# Patient Record
Sex: FEMALE | Race: WHITE | NOT HISPANIC OR LATINO | Employment: FULL TIME | URBAN - METROPOLITAN AREA
[De-identification: names, ages, dates, MRNs, and addresses within clinical notes are randomized per-mention and may not be internally consistent; named-entity substitution may affect disease eponyms.]

---

## 2016-04-22 LAB
EXTERNAL CHLAMYDIA SCREEN: NEGATIVE
EXTERNAL GONORRHEA SCREEN: NEGATIVE

## 2016-12-16 LAB — EXTERNAL GROUP B STREP ANTIGEN: NEGATIVE

## 2017-01-03 ENCOUNTER — GENERIC CONVERSION - ENCOUNTER (OUTPATIENT)
Dept: OTHER | Facility: OTHER | Age: 32
End: 2017-01-03

## 2017-01-09 ENCOUNTER — GENERIC CONVERSION - ENCOUNTER (OUTPATIENT)
Dept: OTHER | Facility: OTHER | Age: 32
End: 2017-01-09

## 2017-01-11 ENCOUNTER — HOSPITAL ENCOUNTER (INPATIENT)
Facility: HOSPITAL | Age: 32
LOS: 2 days | Discharge: HOME | End: 2017-01-13
Attending: OBSTETRICS & GYNECOLOGY | Admitting: OBSTETRICS & GYNECOLOGY
Payer: COMMERCIAL

## 2017-01-11 ENCOUNTER — ANESTHESIA EVENT (INPATIENT)
Dept: LABOR AND DELIVERY | Facility: HOSPITAL | Age: 32
End: 2017-01-11
Payer: COMMERCIAL

## 2017-01-11 ENCOUNTER — ANESTHESIA (INPATIENT)
Dept: LABOR AND DELIVERY | Facility: HOSPITAL | Age: 32
End: 2017-01-11
Payer: COMMERCIAL

## 2017-01-11 DIAGNOSIS — Z3A.39 39 WEEKS GESTATION OF PREGNANCY: Primary | ICD-10-CM

## 2017-01-11 DIAGNOSIS — O42.90: ICD-10-CM

## 2017-01-11 PROBLEM — O26.899 RH NEGATIVE STATUS DURING PREGNANCY: Status: ACTIVE | Noted: 2017-01-11

## 2017-01-11 PROBLEM — Z67.91 RH NEGATIVE STATUS DURING PREGNANCY: Status: ACTIVE | Noted: 2017-01-11

## 2017-01-11 LAB
ABO GROUP BLD: NORMAL
ALBUMIN SERPL BCP-MCNC: 2.9 G/DL (ref 3.5–5)
ALP SERPL-CCNC: 255 U/L (ref 46–116)
ALT SERPL W P-5'-P-CCNC: 20 U/L (ref 12–78)
ANION GAP SERPL CALCULATED.3IONS-SCNC: 10 MMOL/L (ref 4–13)
AST SERPL W P-5'-P-CCNC: 16 U/L (ref 5–45)
BASE EXCESS BLDCOA CALC-SCNC: -3.7 MMOL/L (ref 3–11)
BASE EXCESS BLDCOV CALC-SCNC: -3.2 MMOL/L (ref 1–9)
BILIRUB SERPL-MCNC: 0.47 MG/DL (ref 0.2–1)
BLD GP AB SCN SERPL QL: NEGATIVE
BUN SERPL-MCNC: 13 MG/DL (ref 5–25)
CALCIUM SERPL-MCNC: 9.1 MG/DL (ref 8.3–10.1)
CHLORIDE SERPL-SCNC: 107 MMOL/L (ref 100–108)
CO2 SERPL-SCNC: 22 MMOL/L (ref 21–32)
CREAT SERPL-MCNC: 0.7 MG/DL (ref 0.6–1.3)
CREAT UR-MCNC: 194 MG/DL
ERYTHROCYTE [DISTWIDTH] IN BLOOD BY AUTOMATED COUNT: 14 % (ref 11.6–15.1)
GFR SERPL CREATININE-BSD FRML MDRD: >60 ML/MIN/1.73SQ M
GLUCOSE SERPL-MCNC: 78 MG/DL (ref 65–140)
HCO3 BLDCOA-SCNC: 23.9 MMOL/L (ref 17.3–27.3)
HCO3 BLDCOV-SCNC: 22.1 MMOL/L (ref 12.2–28.6)
HCT VFR BLD AUTO: 38.8 % (ref 34.8–46.1)
HGB BLD-MCNC: 13.6 G/DL (ref 11.5–15.4)
MCH RBC QN AUTO: 30.4 PG (ref 26.8–34.3)
MCHC RBC AUTO-ENTMCNC: 35.1 G/DL (ref 31.4–37.4)
MCV RBC AUTO: 87 FL (ref 82–98)
O2 CT VFR BLDCOA CALC: 5.3 ML/DL
OXYHGB MFR BLDCOA: 24.2 %
OXYHGB MFR BLDCOV: 50.4 %
PCO2 BLDCOA: 52.7 MM[HG] (ref 30–60)
PCO2 BLDCOV: 40.6 MM HG (ref 27–43)
PH BLDCOA: 7.27 [PH] (ref 7.23–7.43)
PH BLDCOV: 7.35 [PH] (ref 7.19–7.49)
PLATELET # BLD AUTO: 177 THOUSANDS/UL (ref 149–390)
PMV BLD AUTO: 11.6 FL (ref 8.9–12.7)
PO2 BLDCOA: 15.3 MM HG (ref 5–25)
PO2 BLDCOV: 23.1 MM HG (ref 15–45)
POTASSIUM SERPL-SCNC: 3.6 MMOL/L (ref 3.5–5.3)
PROT SERPL-MCNC: 7.4 G/DL (ref 6.4–8.2)
PROT UR-MCNC: 31 MG/DL
PROT/CREAT UR: 0.16 MG/G{CREAT} (ref 0–0.1)
RBC # BLD AUTO: 4.48 MILLION/UL (ref 3.81–5.12)
RH BLD: NEGATIVE
SAO2 % BLDCOV: 11.7 ML/DL
SODIUM SERPL-SCNC: 139 MMOL/L (ref 136–145)
URATE SERPL-MCNC: 4.2 MG/DL (ref 2–6.8)
WBC # BLD AUTO: 14.62 THOUSAND/UL (ref 4.31–10.16)

## 2017-01-11 PROCEDURE — 86850 RBC ANTIBODY SCREEN: CPT | Performed by: OBSTETRICS & GYNECOLOGY

## 2017-01-11 PROCEDURE — 84550 ASSAY OF BLOOD/URIC ACID: CPT | Performed by: OBSTETRICS & GYNECOLOGY

## 2017-01-11 PROCEDURE — 0HQ9XZZ REPAIR PERINEUM SKIN, EXTERNAL APPROACH: ICD-10-PCS | Performed by: OBSTETRICS & GYNECOLOGY

## 2017-01-11 PROCEDURE — 82805 BLOOD GASES W/O2 SATURATION: CPT | Performed by: OBSTETRICS & GYNECOLOGY

## 2017-01-11 PROCEDURE — 99204 OFFICE O/P NEW MOD 45 MIN: CPT

## 2017-01-11 PROCEDURE — 86900 BLOOD TYPING SEROLOGIC ABO: CPT | Performed by: OBSTETRICS & GYNECOLOGY

## 2017-01-11 PROCEDURE — 82570 ASSAY OF URINE CREATININE: CPT | Performed by: OBSTETRICS & GYNECOLOGY

## 2017-01-11 PROCEDURE — 80053 COMPREHEN METABOLIC PANEL: CPT | Performed by: OBSTETRICS & GYNECOLOGY

## 2017-01-11 PROCEDURE — 86592 SYPHILIS TEST NON-TREP QUAL: CPT | Performed by: OBSTETRICS & GYNECOLOGY

## 2017-01-11 PROCEDURE — 84156 ASSAY OF PROTEIN URINE: CPT | Performed by: OBSTETRICS & GYNECOLOGY

## 2017-01-11 PROCEDURE — 85027 COMPLETE CBC AUTOMATED: CPT | Performed by: OBSTETRICS & GYNECOLOGY

## 2017-01-11 PROCEDURE — 86901 BLOOD TYPING SEROLOGIC RH(D): CPT | Performed by: OBSTETRICS & GYNECOLOGY

## 2017-01-11 RX ORDER — OXYTOCIN/RINGER'S LACTATE 30/500 ML
PLASTIC BAG, INJECTION (ML) INTRAVENOUS
Status: COMPLETED
Start: 2017-01-11 | End: 2017-01-11

## 2017-01-11 RX ORDER — SODIUM CHLORIDE, SODIUM LACTATE, POTASSIUM CHLORIDE, CALCIUM CHLORIDE 600; 310; 30; 20 MG/100ML; MG/100ML; MG/100ML; MG/100ML
125 INJECTION, SOLUTION INTRAVENOUS CONTINUOUS
Status: DISCONTINUED | OUTPATIENT
Start: 2017-01-11 | End: 2017-01-11

## 2017-01-11 RX ORDER — OXYTOCIN/RINGER'S LACTATE 30/500 ML
250 PLASTIC BAG, INJECTION (ML) INTRAVENOUS
Status: ACTIVE | OUTPATIENT
Start: 2017-01-11 | End: 2017-01-11

## 2017-01-11 RX ORDER — DOCUSATE SODIUM 100 MG/1
100 CAPSULE, LIQUID FILLED ORAL 2 TIMES DAILY
Status: DISCONTINUED | OUTPATIENT
Start: 2017-01-11 | End: 2017-01-13 | Stop reason: HOSPADM

## 2017-01-11 RX ORDER — ONDANSETRON 2 MG/ML
4 INJECTION INTRAMUSCULAR; INTRAVENOUS EVERY 8 HOURS PRN
Status: DISCONTINUED | OUTPATIENT
Start: 2017-01-11 | End: 2017-01-13 | Stop reason: HOSPADM

## 2017-01-11 RX ORDER — DIPHENHYDRAMINE HCL 25 MG
25 TABLET ORAL EVERY 6 HOURS PRN
Status: DISCONTINUED | OUTPATIENT
Start: 2017-01-11 | End: 2017-01-13 | Stop reason: HOSPADM

## 2017-01-11 RX ORDER — ROPIVACAINE HYDROCHLORIDE 2 MG/ML
INJECTION, SOLUTION EPIDURAL; INFILTRATION; PERINEURAL CONTINUOUS PRN
Status: DISCONTINUED | OUTPATIENT
Start: 2017-01-11 | End: 2017-01-11 | Stop reason: SURG

## 2017-01-11 RX ORDER — OXYCODONE HYDROCHLORIDE AND ACETAMINOPHEN 5; 325 MG/1; MG/1
2 TABLET ORAL EVERY 4 HOURS PRN
Status: DISCONTINUED | OUTPATIENT
Start: 2017-01-11 | End: 2017-01-13 | Stop reason: HOSPADM

## 2017-01-11 RX ORDER — DIAPER,BRIEF,INFANT-TODD,DISP
1 EACH MISCELLANEOUS AS NEEDED
Status: DISCONTINUED | OUTPATIENT
Start: 2017-01-11 | End: 2017-01-13 | Stop reason: HOSPADM

## 2017-01-11 RX ORDER — ROPIVACAINE HYDROCHLORIDE 2 MG/ML
INJECTION, SOLUTION EPIDURAL; INFILTRATION; PERINEURAL AS NEEDED
Status: DISCONTINUED | OUTPATIENT
Start: 2017-01-11 | End: 2017-01-11 | Stop reason: SURG

## 2017-01-11 RX ORDER — ACETAMINOPHEN 325 MG/1
650 TABLET ORAL EVERY 6 HOURS PRN
Status: DISCONTINUED | OUTPATIENT
Start: 2017-01-11 | End: 2017-01-13 | Stop reason: HOSPADM

## 2017-01-11 RX ORDER — NALBUPHINE HCL 10 MG/ML
5 AMPUL (ML) INJECTION ONCE
Status: COMPLETED | OUTPATIENT
Start: 2017-01-11 | End: 2017-01-11

## 2017-01-11 RX ORDER — OXYCODONE HYDROCHLORIDE AND ACETAMINOPHEN 5; 325 MG/1; MG/1
1 TABLET ORAL EVERY 4 HOURS PRN
Status: DISCONTINUED | OUTPATIENT
Start: 2017-01-11 | End: 2017-01-13 | Stop reason: HOSPADM

## 2017-01-11 RX ORDER — IBUPROFEN 600 MG/1
600 TABLET ORAL EVERY 6 HOURS PRN
Status: DISCONTINUED | OUTPATIENT
Start: 2017-01-11 | End: 2017-01-13 | Stop reason: HOSPADM

## 2017-01-11 RX ADMIN — SODIUM CHLORIDE, SODIUM LACTATE, POTASSIUM CHLORIDE, AND CALCIUM CHLORIDE 125 ML/HR: .6; .31; .03; .02 INJECTION, SOLUTION INTRAVENOUS at 04:32

## 2017-01-11 RX ADMIN — Medication 1 TABLET: at 13:08

## 2017-01-11 RX ADMIN — DOCUSATE SODIUM 100 MG: 100 CAPSULE, LIQUID FILLED ORAL at 18:25

## 2017-01-11 RX ADMIN — ROPIVACAINE HYDROCHLORIDE 15 ML: 2 INJECTION, SOLUTION EPIDURAL; INFILTRATION at 05:04

## 2017-01-11 RX ADMIN — IBUPROFEN 600 MG: 600 TABLET ORAL at 15:15

## 2017-01-11 RX ADMIN — IBUPROFEN 600 MG: 600 TABLET ORAL at 21:59

## 2017-01-11 RX ADMIN — Medication 250 MILLI-UNITS/MIN: at 10:05

## 2017-01-11 RX ADMIN — WITCH HAZEL 1 PAD: 500 SOLUTION RECTAL; TOPICAL at 13:09

## 2017-01-11 RX ADMIN — BENZOCAINE AND MENTHOL 1 APPLICATION: 20; .5 SPRAY TOPICAL at 13:09

## 2017-01-11 RX ADMIN — SODIUM CHLORIDE, SODIUM LACTATE, POTASSIUM CHLORIDE, AND CALCIUM CHLORIDE 125 ML/HR: .6; .31; .03; .02 INJECTION, SOLUTION INTRAVENOUS at 04:45

## 2017-01-11 RX ADMIN — NALBUPHINE HYDROCHLORIDE 5 MG: 10 INJECTION, SOLUTION INTRAMUSCULAR; INTRAVENOUS; SUBCUTANEOUS at 04:32

## 2017-01-11 RX ADMIN — ROPIVACAINE HYDROCHLORIDE 10 ML/HR: 2 INJECTION, SOLUTION EPIDURAL; INFILTRATION at 05:04

## 2017-01-11 RX ADMIN — HYDROCORTISONE 1 APPLICATION: 10 CREAM TOPICAL at 18:46

## 2017-01-12 LAB
BASOPHILS # BLD AUTO: 0.02 THOUSANDS/ΜL (ref 0–0.1)
BASOPHILS NFR BLD AUTO: 0 % (ref 0–1)
EOSINOPHIL # BLD AUTO: 0.09 THOUSAND/ΜL (ref 0–0.61)
EOSINOPHIL NFR BLD AUTO: 1 % (ref 0–6)
ERYTHROCYTE [DISTWIDTH] IN BLOOD BY AUTOMATED COUNT: 14.3 % (ref 11.6–15.1)
HCT VFR BLD AUTO: 30.9 % (ref 34.8–46.1)
HGB BLD-MCNC: 10.5 G/DL (ref 11.5–15.4)
LYMPHOCYTES # BLD AUTO: 2.19 THOUSANDS/ΜL (ref 0.6–4.47)
LYMPHOCYTES NFR BLD AUTO: 12 % (ref 14–44)
MCH RBC QN AUTO: 29.7 PG (ref 26.8–34.3)
MCHC RBC AUTO-ENTMCNC: 34 G/DL (ref 31.4–37.4)
MCV RBC AUTO: 88 FL (ref 82–98)
MONOCYTES # BLD AUTO: 1.1 THOUSAND/ΜL (ref 0.17–1.22)
MONOCYTES NFR BLD AUTO: 6 % (ref 4–12)
NEUTROPHILS # BLD AUTO: 14.15 THOUSANDS/ΜL (ref 1.85–7.62)
NEUTS SEG NFR BLD AUTO: 81 % (ref 43–75)
NRBC BLD AUTO-RTO: 0 /100 WBCS
PLATELET # BLD AUTO: 155 THOUSANDS/UL (ref 149–390)
PMV BLD AUTO: 11.5 FL (ref 8.9–12.7)
RBC # BLD AUTO: 3.53 MILLION/UL (ref 3.81–5.12)
RPR SER QL: NORMAL
WBC # BLD AUTO: 17.61 THOUSAND/UL (ref 4.31–10.16)

## 2017-01-12 PROCEDURE — 85025 COMPLETE CBC W/AUTO DIFF WBC: CPT | Performed by: OBSTETRICS & GYNECOLOGY

## 2017-01-12 RX ADMIN — IBUPROFEN 600 MG: 600 TABLET ORAL at 08:35

## 2017-01-12 RX ADMIN — IBUPROFEN 600 MG: 600 TABLET ORAL at 17:01

## 2017-01-12 RX ADMIN — DOCUSATE SODIUM 100 MG: 100 CAPSULE, LIQUID FILLED ORAL at 08:34

## 2017-01-12 RX ADMIN — DOCUSATE SODIUM 100 MG: 100 CAPSULE, LIQUID FILLED ORAL at 17:04

## 2017-01-12 RX ADMIN — Medication 1 TABLET: at 08:34

## 2017-01-13 VITALS
SYSTOLIC BLOOD PRESSURE: 121 MMHG | OXYGEN SATURATION: 99 % | WEIGHT: 213 LBS | BODY MASS INDEX: 32.28 KG/M2 | DIASTOLIC BLOOD PRESSURE: 73 MMHG | HEIGHT: 68 IN | TEMPERATURE: 97.9 F | HEART RATE: 72 BPM | RESPIRATION RATE: 20 BRPM

## 2017-01-13 PROBLEM — Z3A.39 39 WEEKS GESTATION OF PREGNANCY: Status: RESOLVED | Noted: 2017-01-11 | Resolved: 2017-01-13

## 2017-01-13 RX ORDER — IBUPROFEN 600 MG/1
600 TABLET ORAL EVERY 6 HOURS PRN
Qty: 30 TABLET | Refills: 0
Start: 2017-01-13 | End: 2018-04-27 | Stop reason: HOSPADM

## 2017-01-13 RX ORDER — DIAPER,BRIEF,INFANT-TODD,DISP
1 EACH MISCELLANEOUS AS NEEDED
Qty: 30 G | Refills: 0
Start: 2017-01-13 | End: 2018-04-27 | Stop reason: HOSPADM

## 2017-01-13 RX ORDER — DOCUSATE SODIUM 100 MG/1
100 CAPSULE, LIQUID FILLED ORAL 2 TIMES DAILY
Qty: 60 CAPSULE | Refills: 0
Start: 2017-01-13 | End: 2018-04-27 | Stop reason: HOSPADM

## 2017-01-13 RX ORDER — ACETAMINOPHEN 325 MG/1
TABLET ORAL
Qty: 30 TABLET | Refills: 0
Start: 2017-01-13

## 2017-01-13 RX ADMIN — Medication 1 TABLET: at 07:58

## 2017-01-13 RX ADMIN — DOCUSATE SODIUM 100 MG: 100 CAPSULE, LIQUID FILLED ORAL at 07:52

## 2017-01-13 RX ADMIN — IBUPROFEN 600 MG: 600 TABLET ORAL at 07:52

## 2017-01-13 RX ADMIN — IBUPROFEN 600 MG: 600 TABLET ORAL at 00:14

## 2017-01-19 ENCOUNTER — TELEPHONE (OUTPATIENT)
Dept: LABOR AND DELIVERY | Facility: HOSPITAL | Age: 32
End: 2017-01-19

## 2017-01-20 LAB — PLACENTA IN STORAGE: NORMAL

## 2017-02-01 ENCOUNTER — TELEPHONE (OUTPATIENT)
Dept: LABOR AND DELIVERY | Facility: HOSPITAL | Age: 32
End: 2017-02-01

## 2017-02-21 ENCOUNTER — ALLSCRIPTS OFFICE VISIT (OUTPATIENT)
Dept: OTHER | Facility: OTHER | Age: 32
End: 2017-02-21

## 2017-03-01 ENCOUNTER — ALLSCRIPTS OFFICE VISIT (OUTPATIENT)
Dept: OTHER | Facility: OTHER | Age: 32
End: 2017-03-01

## 2017-03-17 ENCOUNTER — ALLSCRIPTS OFFICE VISIT (OUTPATIENT)
Dept: OTHER | Facility: OTHER | Age: 32
End: 2017-03-17

## 2017-03-29 ENCOUNTER — GENERIC CONVERSION - ENCOUNTER (OUTPATIENT)
Dept: OTHER | Facility: OTHER | Age: 32
End: 2017-03-29

## 2017-06-02 ENCOUNTER — GENERIC CONVERSION - ENCOUNTER (OUTPATIENT)
Dept: OTHER | Facility: OTHER | Age: 32
End: 2017-06-02

## 2017-07-28 ENCOUNTER — LAB REQUISITION (OUTPATIENT)
Dept: LAB | Facility: HOSPITAL | Age: 32
End: 2017-07-28
Payer: COMMERCIAL

## 2017-07-28 ENCOUNTER — ALLSCRIPTS OFFICE VISIT (OUTPATIENT)
Dept: OTHER | Facility: OTHER | Age: 32
End: 2017-07-28

## 2017-07-28 DIAGNOSIS — Z01.419 ENCOUNTER FOR GYNECOLOGICAL EXAMINATION WITHOUT ABNORMAL FINDING: ICD-10-CM

## 2017-07-28 PROCEDURE — 88175 CYTOPATH C/V AUTO FLUID REDO: CPT | Performed by: OBSTETRICS & GYNECOLOGY

## 2017-07-28 PROCEDURE — 87624 HPV HI-RISK TYP POOLED RSLT: CPT | Performed by: OBSTETRICS & GYNECOLOGY

## 2017-08-02 LAB — HPV RRNA GENITAL QL NAA+PROBE: NORMAL

## 2017-08-05 LAB
LAB AP GYN PRIMARY INTERPRETATION: NORMAL
Lab: NORMAL

## 2017-08-18 ENCOUNTER — ALLSCRIPTS OFFICE VISIT (OUTPATIENT)
Dept: OTHER | Facility: OTHER | Age: 32
End: 2017-08-18

## 2017-11-10 ENCOUNTER — ALLSCRIPTS OFFICE VISIT (OUTPATIENT)
Dept: OTHER | Facility: OTHER | Age: 32
End: 2017-11-10

## 2017-11-24 ENCOUNTER — ALLSCRIPTS OFFICE VISIT (OUTPATIENT)
Dept: OTHER | Facility: OTHER | Age: 32
End: 2017-11-24

## 2017-11-25 NOTE — PROGRESS NOTES
Assessment    1  Acute non-recurrent maxillary sinusitis (461 0) (J01 00)    Plan  Acute non-recurrent maxillary sinusitis    · Amoxicillin 875 MG Oral Tablet; TAKE 1 TABLET EVERY 12 HOURS DAILY    Discussion/Summary  Discussion Summary:   Patient presents with acute sinusitis  start Amoxil 875 mg twice daily  to increase fluid intake, take Tylenol PRN for headache, use saline sinus rinse  office if symptoms persist or worsen  Counseling Documentation With Imm: The patient was counseled regarding instructions for management,-- risk factor reductions,-- risks and benefits of treatment options,-- importance of compliance with treatment  Medication SE Review and Pt Understands Tx: Possible side effects of new medications were reviewed with the patient/guardian today  The treatment plan was reviewed with the patient/guardian  The patient/guardian understands and agrees with the treatment plan      Chief Complaint  Chief Complaint Free Text Note Form: Former patient here with sinus pressure, runny nose, headache, and fatigue since last Wednesday  Patient is breast feeding, not taking any otc meds  History of Present Illness  HPI: Patient presents to the office c/o sinus pressure, nasal congestion, headache, postnasal drainage, runny nose for over a week  cough  No chest congestion or wheezing  No history of recurrent sinus infections in the past  has 10 mo  old baby, breast feeding  is getting Depo-Provera injections as per gynecology, last injection was 2 weeks ago  tobacco use  Review of Systems  Complete-Female:  Constitutional: no fever,-- no chills-- and-- not feeling tired  Eyes: no eye pain,-- no eyesight problems,-- no dryness of the eyes,-- eyes not red,-- no purulent discharge from the eyes-- and-- no itching of the eyes  ENT: nasal discharge-- and-- nasal congestion, sinus pressure, PND, but-- no earache,-- no nosebleeds,-- no sore throat,-- no hearing loss-- and-- no hoarseness  Cardiovascular: no chest pain,-- no palpitations-- and-- no lower extremity edema  Respiratory: no shortness of breath,-- no cough,-- no wheezing-- and-- no shortness of breath during exertion  Gastrointestinal: no abdominal pain,-- no nausea,-- no vomiting-- and-- no diarrhea  Musculoskeletal: no arthralgias-- and-- no joint swelling  Integumentary: no rashes-- and-- no itching  Neurological: headache, but-- no dizziness  Psychiatric: no anxiety,-- no sleep disturbances-- and-- no depression  Hematologic/Lymphatic: No complaints of swollen glands, no swollen glands in the neck, does not bleed easily, does not bruise easily  Active Problems  1  Encounter for gynecological examination without abnormal finding (V72 31) (Z01 419)   2  Encounter for management and injection of depo-Provera (V25 49) (Z30 42)   3  Flu vaccine need (V04 81) (Z23)   4  Irregular bleeding (626 4) (N92 6)    Past Medical History  1  History of Cervical polyp (622 7) (N84 1)   2  History of allergic rhinitis (V12 69) (Z87 09)   3  History of asthma (V12 69) (Z87 09)   4  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)   5  History of Postcoital bleeding (626 7) (N93 0)  Active Problems And Past Medical History Reviewed: The active problems and past medical history were reviewed and updated today  Surgical History    1  History of Colposcopy   2  History of Corneal LASIK Bilateral   3  History of Oral Surgery Tooth Extraction Huntington Tooth  Surgical History Reviewed: The surgical history was reviewed and updated today  Family History  Mother    1  No pertinent family history  Maternal Grandmother    2  Family history of malignant neoplasm of breast (V16 3) (Z80 3)   3  Family history of Malignant neoplasm of lung, unspecified laterality, unspecified part of lung  Paternal Grandfather    4  Family history of Malignant neoplasm of lung, unspecified laterality, unspecified part of lung  Family History Reviewed:    The family history was reviewed and updated today  Social History     · Almost always uses seat belt   · Being A Social Drinker   · Caffeine Use   ·    · Never a smoker  Social History Reviewed: The social history was reviewed and updated today  Current Meds   1  [employer]S Prenatal TABS; Therapy: (Recorded:70Ntk5565) to Recorded   2  MedroxyPROGESTERone Acetate 150 MG/ML Intramuscular Suspension; INJECT INTRAMUSCULARLY EVERY 12 WEEKS AS DIRECTED; Therapy: 84BLC2774 to (Last Rx:02Jun2017)  Requested for: 02Jun2017 Ordered    Allergies  1  No Known Drug Allergies  2  Seasonal    Vitals  Vital Signs    Recorded: 11MZJ1014 02:42PM   Temperature 99 3 F, Tympanic   Heart Rate 68, L Radial   Respiration 12   Systolic 120, LUE   Diastolic 88, LUE   Height 5 ft 7 in   Weight 178 lb 9 6 oz   BMI Calculated 27 97   BSA Calculated 1 93   Pain Scale 0       Physical Exam   Constitutional  General appearance: No acute distress, well appearing and well nourished  Eyes  Conjunctiva and lids: No swelling, erythema or discharge  Pupils and irises: Equal, round and reactive to light  Ears, Nose, Mouth, and Throat  External inspection of ears and nose: Normal    Otoscopic examination: Tympanic membranes translucent with normal light reflex  Canals patent without erythema  Nasal mucosa, septum, and turbinates: Abnormal   The bilateral nasal mucosa was edematous-- and-- red  Oropharynx: Abnormal   The posterior pharynx was erythematous, but-- did not have an exudate  Pulmonary  Respiratory effort: No increased work of breathing or signs of respiratory distress  Auscultation of lungs: Clear to auscultation  Cardiovascular  Auscultation of heart: Normal rate and rhythm, normal S1 and S2, without murmurs  Examination of extremities for edema and/or varicosities: Normal    Lymphatic  Palpation of lymph nodes in neck: No lymphadenopathy  Skin  Skin and subcutaneous tissue: Normal without rashes or lesions  Psychiatric  Mood and affect: Normal          Future Appointments    Date/Time Provider Specialty Site   08/03/2018 01:00 PM Mendel Maxwell, M D   Obstetrics/Gynecology [facility]   02/02/2018 08:15 AM JOHN INJECTION, Nurse Schedule  [facility]       Signatures   Electronically signed by : OSEAS Delong ; Nov 24 2017  3:11PM EST                       (Author)

## 2018-01-09 NOTE — MISCELLANEOUS
Message  Spoke to patient over the phone  Discussed the results of her workup for abnormal uterine bleeding  In review a prolactin was not done  We will order a serum prolactin  Patient complains of continued irregular bleeding and wonders why this occurred suddenly  We will have her come in for a another exam and I will do a colposcopy at that visit        Signatures   Electronically signed by : Elyse Sandifer, M D ; May  5 2016  4:51PM EST                       (Author)

## 2018-01-10 NOTE — RESULT NOTES
Verified Results  (1) SEQUENTIAL SCREEN 2 [id number] 07:26AM Ha Nunes     Test Name Result Flag Reference   SCAN RESULT      Results available in the [facility]

## 2018-01-10 NOTE — PROGRESS NOTES
2016         RE: Dae Ambriz                                 To: [facility]  MR#: [id number]                                    [address]   : AUG [address]   ENC: [id number]:FATMATA Cole, [address]   (Exam #: [id number])                           Fax: [phone]      The LMP of this 28year old,  G1, P0-0-0-0 patient was 2016, giving   her an ERIC of 2017 and a current gestational age of 29 weeks 1 day   by dates  A sonographic examination was performed on 2016 using   real time equipment  The ultrasound examination was performed using   abdominal technique  The patient has a BMI of 32 4  Her blood pressure   today was 116/69  Earliest ultrasound found in her record: 16 12w1d  01/10/17  ERIC      Cardiac motion was observed at 143 bpm       INDICATIONS      first trimester bleeding   obesity   second trimester bleeding   fetal growth      Exam Types      Level I      RESULTS      Fetus # 1 of 1   Vertex presentation   Placenta Location = Anterior   No placenta previa   Placenta Grade = II      MEASUREMENTS (* Included In Average GA)      AC              31 1 cm        35 weeks 1 day * (84%)   BPD              8 5 cm        34 weeks 1 day * (64%)   HC              31 2 cm        34 weeks 3 days* (59%)   Femur            6 5 cm        33 weeks 1 day * (56%)      Cerebellum       4 2 cm        34 weeks 2 days      HC/AC           1 00   FL/AC           0 21   FL/BPD          0 76   EFW (Ac/Fl/Hc)  2448 grams - 5 lbs 6 oz                 (66%)      THE AVERAGE GESTATIONAL AGE is 34 weeks 2 days +/- 21 days        AMNIOTIC FLUID      Q1: 3 6      Q2: 2 8      Q3: 2 9      Q4: 3 3   ALAN Total = 12 6 cm   Amniotic Fluid: Normal      ANATOMY DETAILS      Visualized Appearing Sonographically Normal:   HEAD: (Calvarium, BPD Level, Cavum, Lateral Ventricles, Cerebellum);      TH  CAV : (Diaphragm); HEART: (Four Chamber View, Proximal Left   Outflow, Proximal Right Outflow, 3VV, Interventricular Septum, Interatrial   Septum, Cardiac Axis, Cardiac Position);    STOMACH, RIGHT KIDNEY, LEFT   KIDNEY, BLADDER, PLACENTA      BIOPHYSICAL PROFILE      The Biophysical Profile score was 8/8  Breathin  Movement: 2  Tone: 2  AFV: 2      IMPRESSION      [place] IUP   34 weeks and 2 days by this ultrasound  (ERIC=2017)   Vertex presentation   Regular fetal heart rate of 143 bpm   Anterior placenta   No placenta previa      GENERAL COMMENT      Patient reports she has a cervical polyp which may have been the source of   her prior bleeding  She reports that her bleeding stopped soon after her   20 week ultrasound  Her blood type is O- and she has received Rhogam in   this pregnancy  RECOMMENDATION      Growth Ultrasound: As indicated      SURESH Pereira M D     Maternal-Fetal Medicine   Electronically signed 16 19:24

## 2018-01-10 NOTE — PROGRESS NOTES
Chief Complaint  Pt recieced her flu shot today  Given in the Left Deltoid  Lot # [id number] EXP Date 6/30/2017  Dara Soulier NDC# 04054-825-52  Pt tolerated injection well      Active Problems    1  1St trimester screening (V28 89) (Z36)   2  Abnormal uterine bleeding (626 9) (N93 9)   3  Asthma (493 90) (J45 909)   4  Cervical lesion (622 9) (N88 9)   5  Cervical polyp (622 7) (N84 1)   6  DUB (dysfunctional uterine bleeding) (626 8) (N93 8)   7  Encounter for fetal anatomic survey (V28 81) (Z36)   8  Encounter for routine gynecological examination (V72 31) (Z01 419)   9  Encounter for screening for risk of pre-term labor (V28 82) (Z36)   10  Encounter for screening mammogram for malignant neoplasm of breast (V76 12)    (Z12 31)   11  First-trimester bleeding (640 93) (O20 9)   12  Need for prophylactic vaccination with combined diphtheria-tetanus-pertussis (DTaP)    vaccine (V06 1) (Z23)   13  Need for rhogam due to Rh negative mother (V07 2) (O09 899)   14  Pap smear, as part of routine gynecological examination (V76 2) (Z01 419)   15  Positive pregnancy test (V72 42) (Z32 01)   16  Postcoital bleeding (626 7) (N93 0)   17  Pregnancy, supervision of first (V22 0) (Z34 00)   18  Rh incompatibility affecting pregnancy (656 10) (O36 0990)   19  Second trimester bleeding (641 93) (O46 92)   20  Spotting (623 8) (N92 0)   21  Type a blood, rh negative (V49 89) (Z67 11)   22  Unspecified disorder of menstruation and other abnormal bleeding from female genital    tract (626 9) (N92 6,N93 9)    Current Meds   1  CVS Prenatal TABS; Therapy: (Recorded:86Ilz9159) to Recorded   2  RhoGAM Ultra-Filtered Plus 1500 UNIT Intramuscular Solution Prefilled Syringe; 1   injection as needed; Therapy: 05FBX9373 to Recorded    Allergies    1  No Known Drug Allergies    2   Seasonal    Vitals  Signs    Systolic: 617  Diastolic: 60  LMP: 48BYE4572  Height: 5 ft 7 in  Weight: 202 lb   BMI Calculated: 31 64  BSA Calculated: 2 03    Plan  Need for prophylactic vaccination with combined diphtheria-tetanus-pertussis (DTaP)  vaccine    · Fluzone Quadrivalent 0 5 ML Intramuscular Suspension Prefilled Syringe;  INJECT 0 5  ML Intramuscular; To Be Done: 75YHV2866  Pregnancy, supervision of first    · (1) CBC/PLT/DIFF; Status:Active; Requested for:49Dvo2855;    · (1) GLUCOSE, 1HR PG (50gm Glu Challenge Preg-Pts); Status:Active; Requested  SFR:91XBD7670;    · (1) RPR; Status:Active; Requested CRP:21TQE0605;     Future Appointments    Date/Time Provider Specialty Site   2016 09:20 AM OSEAS Flores   Obstetrics/Gynecology [facility]   2016 01:40 PM Fabio Junior [address] [place] [address] Obstetrics/Gynecology [facility]   2016 09:20 AM Aileen Rodriguez  Obstetrics/Gynecology [facility]   2016 11:40 AM Aileen Rodriguez  Obstetrics/Gynecology [facility]   10/19/2016 08:40 AM Emmett Kong [facility] Obstetrics/Gynecology [facility]   10/28/2016 09:20 AM Emmett Kong [facility] Obstetrics/Gynecology [facility]   2016 09:40 AM Emmett Kong [facility] Obstetrics/Gynecology [facility]   2017 09:20 AM Emmett Kong [facility] Obstetrics/Gynecology [facility]   2016 11:30 AM  Chi Tolentino  [facility]     Signatures   Electronically signed by : OSEAS Pfeiffer ; Sep 26 2016  4:26PM EST                       (Author)

## 2018-01-12 VITALS
DIASTOLIC BLOOD PRESSURE: 68 MMHG | SYSTOLIC BLOOD PRESSURE: 110 MMHG | HEIGHT: 67 IN | WEIGHT: 181 LBS | BODY MASS INDEX: 28.41 KG/M2

## 2018-01-12 NOTE — PROGRESS NOTES
Chief Complaint  TdaP vaccine given in Left deltoid without any difficulty- cmt  Active Problems    1  Asthma (493 90) (J45 909)   2  Cervical polyp (622 7) (N84 1)   3  Encounter for fetal anatomic survey (V28 81) (Z36)   4  Encounter for screening for risk of pre-term labor (V28 82) (Z36)   5  First-trimester bleeding (640 93) (O20 9)   6  Postcoital bleeding (626 7) (N93 0)   7  Pregnancy, supervision of first (V22 0) (Z34 00)   8  Rh incompatibility affecting pregnancy (656 10) (O36 0990)   9  Second trimester bleeding (641 93) (O46 92)    Current Meds   1  CVS Prenatal TABS; Therapy: (Recorded:09Eho0347) to Recorded   2  RhoGAM Ultra-Filtered Plus 1500 UNIT Intramuscular Solution Prefilled Syringe; 1   injection as needed; Therapy: 18YZR8979 to Recorded    Allergies    1  No Known Drug Allergies    2  Seasonal    Vitals  Signs    Systolic: 035, LUE, Sitting  Diastolic: 68, LUE, Sitting  LMP: 82Keb0813  Weight: 204 lb   BMI Calculated: 31 95  BSA Calculated: 2 04    Plan  Pregnancy, supervision of first    · Tdap (Adacel)    Future Appointments    Date/Time Provider Specialty Site   2016 09:20 AM OSEAS Wall   Ob[facility]Gynecology [facility]   2016 01:40 PM Keisha Solomon, [facility]  Obstetrics[facility]Gynecology [facility]   2016 09:20 AM Danny Peck DO Obstetrics/Gynecology [facility]   2016 11:40 AM Danny Peck DO Obstetrics/Gynecology [facility]   2016 09:40 AM Remberto Gonzalez, [address] Obstetrics/Gynecology [facility]   2017 09:20 AM Remberto Gonzalez, [address] Obstetrics/Gynecology [facility]   2016 11:30 AM  [facility] [address] [place] [address]     Signatures   Electronically signed by : OSEAS Rowley ; 2016  8:16PM EST

## 2018-01-13 VITALS
SYSTOLIC BLOOD PRESSURE: 128 MMHG | DIASTOLIC BLOOD PRESSURE: 88 MMHG | WEIGHT: 178.6 LBS | RESPIRATION RATE: 12 BRPM | HEART RATE: 68 BPM | BODY MASS INDEX: 28.03 KG/M2 | HEIGHT: 67 IN | TEMPERATURE: 99.3 F

## 2018-01-13 VITALS
BODY MASS INDEX: 28.25 KG/M2 | WEIGHT: 180 LBS | SYSTOLIC BLOOD PRESSURE: 130 MMHG | HEIGHT: 67 IN | DIASTOLIC BLOOD PRESSURE: 80 MMHG

## 2018-01-14 VITALS — DIASTOLIC BLOOD PRESSURE: 80 MMHG | WEIGHT: 186 LBS | BODY MASS INDEX: 28.28 KG/M2 | SYSTOLIC BLOOD PRESSURE: 138 MMHG

## 2018-01-14 VITALS
WEIGHT: 186 LBS | SYSTOLIC BLOOD PRESSURE: 120 MMHG | BODY MASS INDEX: 29.19 KG/M2 | HEIGHT: 67 IN | DIASTOLIC BLOOD PRESSURE: 80 MMHG

## 2018-01-14 NOTE — PROGRESS NOTES
Chief Complaint  Patient was given Rhogam injection, IM right buttocks with no complications  She is 28 weeks, 1 day pregnant  Active Problems    1  1St trimester screening (V28 89) (Z36)   2  Abnormal uterine bleeding (626 9) (N93 9)   3  Asthma (493 90) (J45 909)   4  Cervical lesion (622 9) (N88 9)   5  Cervical polyp (622 7) (N84 1)   6  DUB (dysfunctional uterine bleeding) (626 8) (N93 8)   7  Encounter for fetal anatomic survey (V28 81) (Z36)   8  Encounter for immunization (V03 89) (Z23)   9  Encounter for routine gynecological examination (V72 31) (Z01 419)   10  Encounter for screening for risk of pre-term labor (V28 82) (Z36)   11  Encounter for screening mammogram for malignant neoplasm of breast (V76 12)    (Z12 31)   12  First-trimester bleeding (640 93) (O20 9)   13  Need for prophylactic vaccination with combined diphtheria-tetanus-pertussis (DTaP)    vaccine (V06 1) (Z23)   14  Need for rhogam due to Rh negative mother (V07 2) (O09 899)   15  Pap smear, as part of routine gynecological examination (V76 2) (Z01 419)   16  Positive pregnancy test (V72 42) (Z32 01)   17  Postcoital bleeding (626 7) (N93 0)   18  Pregnancy, supervision of first (V22 0) (Z34 00)   19  Rh incompatibility affecting pregnancy (656 10) (O36 0990)   20  Second trimester bleeding (641 93) (O46 92)   21  Spotting (623 8) (N92 0)   22  Type a blood, rh negative (V49 89) (Z67 11)   23  Unspecified disorder of menstruation and other abnormal bleeding from female genital    tract (626 9) (N92 6,N93 9)    Current Meds   1  CVS Prenatal TABS; Therapy: (Recorded:77Uwn0193) to Recorded   2  RhoGAM Ultra-Filtered Plus 1500 UNIT Intramuscular Solution Prefilled Syringe; 1   injection as needed; Therapy: 58CBI4212 to Recorded    Allergies    1  No Known Drug Allergies    2   Seasonal    Vitals  Signs    Systolic: 452, LUE, Sitting  Diastolic: 68, LUE, Sitting  Height: 5 ft 7 in  Weight: 203 lb   BMI Calculated: 31 79  BSA Calculated: 2 03    Plan  Rh incompatibility affecting pregnancy, Type a blood, rh negative    · RhoGAM Ultra-Filtered Plus 1500 UNIT Intramuscular Solution Prefilled  Syringe    Future Appointments    Date/Time Provider Specialty Site   2016 09:20 AM OSEAS Cunningham   Obstetrics/Gynecology [facility]   2016 01:40 PM Christopher Loco, [facility] Obstetrics/Gynecology [facility]   2016 09:20 AM Izzy Child, DO Obstetrics/Gynecology [facility]   2016 11:40 AM Izzy Child, DO Obstetrics/Gynecology [facility]   10/28/2016 09:20 AM Lonjosselyne Devantey, [address] Obstetrics/Gynecology [facility]   2016 09:40 AM Lonjosselyne Devantey, [address] Obstetrics/Gynecology [facility]   2017 09:20 AM Lonjosselyne Devantey, [address] Obstetrics/Gynecology [facility]   2016 11:30 AM  Douglas, [address]   Electronically signed by : Pooja Painter, ; Oct 21 2016  9:22AM EST                       (Author)    Electronically signed by : Chon Finn, [address]; Oct 21 2016 10:24AM EST                       (Author)    Electronically signed by : OSEAS Gallardo ; Oct 21 2016  1:51PM EST                       (Author)

## 2018-01-14 NOTE — PROGRESS NOTES
Chief Complaint  Patient was given medroxyprogesterone 150 mg/ml IM in left buttocks with no complications  She will return in 12 weeks for next injection  [place], [place]      Active Problems    1  Asthma (493 90) (J45 909)   2  Encounter for management and injection of depo-Provera (V25 49) (Z30 42)   3  Irregular bleeding (626 4) (N92 6)    Current Meds   1  CVS Prenatal TABS; Therapy: (Recorded:29Tsz0319) to Recorded   2  MedroxyPROGESTERone Acetate 150 MG/ML Intramuscular Suspension; INJECT   INTRAMUSCULARLY EVERY 12 WEEKS AS DIRECTED; Therapy: 32Dkw4554 to (Last Rx:57Tbd1143)  Requested for: 14Ofq0984   Ordered    Allergies    1  No Known Drug Allergies    2  Seasonal    Vitals  Signs    Systolic: 212  Diastolic: 80  Height: 5 ft 7 in  Weight: 180 lb   BMI Calculated: 28 19  BSA Calculated: 1 93  LMP: depo    Plan  Encounter for management and injection of depo-Provera    · MedroxyPROGESTERone Acetate 150 MG/ML Intramuscular Suspension; INJECT  INTRAMUSCULARLY EVERY 12 WEEKS AS DIRECTED   · MedroxyPROGESTERone Acetate 150 MG/ML Intramuscular Suspension    Future Appointments    Date/Time Provider Specialty Site   07/28/2017 11:40 AM OSEAS Hester   Obstetrics/Gynecology [facility]   08/18/2017 08:00 AM JOHN INJECTION, Nurse Schedule  [facility]     Signatures   Electronically signed by : Murray Kearney, [facility]; Jun 2 2017  9:27AM EST                       (Author)    Electronically signed by : OSEAS Blanton ; Jun 6 2017  1:43PM EST                       (Author)

## 2018-01-15 NOTE — MISCELLANEOUS
Message   Recorded as Task   Date: 08/29/2016 01:29 PM, Created By: Estefani Mckenzie   Task Name: Care Coordination   Assigned To: Marilyn Calderon   Regarding Patient: Jessica Mclean, Status: In Progress   Comment:    Marilyn Calderon - 29 Aug 2016 1:29 PM     TASK CREATED  Pt is RH neg  Patient aware Rhogam will be ordered  Due date is: 1/12/2017 Hmax   April Waldron - 29 Aug 2016 1:46 PM     TASK IN PROGRESS   April Waldron - 29 Aug 2016 1:48 PM     TASK REPLIED TO: Previously Assigned To [facility],Team  thanks I have in my book        Active Problems    1  1St trimester screening (V28 89) (Z36)   2  Abnormal uterine bleeding (626 9) (N93 9)   3  Asthma (493 90) (J45 909)   4  Cervical lesion (622 9) (N88 9)   5  Cervical polyp (622 7) (N84 1)   6  DUB (dysfunctional uterine bleeding) (626 8) (N93 8)   7  Encounter for routine gynecological examination (V72 31) (Z01 419)   8  Encounter for screening mammogram for malignant neoplasm of breast (V76 12)   (Z12 31)   9  First-trimester bleeding (640 93) (O20 9)   10  Need for prophylactic vaccination with combined diphtheria-tetanus-pertussis (DTaP)    vaccine (V06 1) (Z23)   11  Need for rhogam due to Rh negative mother (V07 2) (O09 899)   12  Pap smear, as part of routine gynecological examination (V76 2) (Z01 419)   13  Positive pregnancy test (V72 42) (Z32 01)   14  Postcoital bleeding (626 7) (N93 0)   15  Pregnancy, supervision of first (V22 0) (Z34 00)   16  Spotting (623 8) (N92 0)   17  Type a blood, rh negative (V49 89) (Z67 11)   18  Unspecified disorder of menstruation and other abnormal bleeding from female genital    tract (626 9) (N92 6,N93 9)    Current Meds   1  CVS Prenatal TABS; Therapy: (Recorded:23Cck5230) to Recorded    Allergies    1  No Known Drug Allergies    2   Seasonal    Signatures   Electronically signed by : Vaishnavi Chua, ; Aug 29 2016  1:50PM EST                       (Author)

## 2018-01-16 NOTE — PROGRESS NOTES
2016         RE: Jerrica Tompkins                                 To: [facility]  MR#: [id number]                                    [address]   : AUG 2025 [facility] [address]   ENC: [id number]:HAIDER Cole, [address]   (Exam #: [id number])                           Fax: [phone]      The LMP of this 35year old,  G1, P0-0-0-0 patient was 2016, giving   her an ERIC of 2017 and a current gestational age of 17 weeks 1 day   by dates  A sonographic examination was performed on 2016 using real   time equipment  The ultrasound examination was performed using abdominal   technique  The patient has a BMI of 30 9  Her blood pressure today was   106/68  Earliest ultrasound found in her record: 16 12w1d  01/10/17  ERIC      Thank you very much for your kind referral of Jerrica Tompkins to the   [address] [place] [address] in [place] on 2016 for first trimester   ultrasound evaluation, genetic screening, and  assessment  Magdaleno Mancini is a 27-year-old primigravida who is currently at 13-1/7 weeks   gestation by an estimated due date of 2017  Her prenatal   course has been remarkable for intermittent first trimester vaginal   bleeding, possibly related to a cervical polyp according to the history   given to me today  She denies abdominal cramping  Otherwise, she feels   well and has no complaints at her visit today  Magdaleno Mancini has an unremarkable past medical history  Her past surgical   history significant for a needle biopsy of a thyroid nodule, found to be   benign  She also had LASIK surgery  She currently takes no medication with   the exception of a prenatal vitamin on a daily basis and has no known drug   allergy  She denies tobacco, alcohol, or illicit drug use during the   pregnancy   The family genetic history is negative with respect to genetic   abnormalities, birth defects, or mental retardation  Her mom and dad each   has hypertension  There is no first degree family member with a diagnosis   of diabetes or venous thromboembolism  Multiple longitudinal and transverse sections revealed a moreno   intrauterine pregnancy with the fetus in variable presentation  The   placenta is anterior in implantation  Cardiac motion was observed at 154 bpm       INDICATIONS      first trimester genetic screening   first trimester bleeding      Exam Types      Level I   sequential screen      RESULTS      Fetus # 1 of 1   Variable presentation   Fetal growth appeared normal      MEASUREMENTS (* Included In Average GA)      CRL              7 5 cm        13 weeks 2 days*   Nuchal Trans    2 00 mm      THE AVERAGE GESTATIONAL AGE is 13 weeks 2 days +/- 7 days  UTERINE ARTERIES                                  S/D   PI    RI    NOTCH       Left Uterine Artery              0 87         No       Right Uterine Artery             1 22         No      ANATOMY COMMENTS      Anatomic detail is limited at this gestational age  The yolk sac was not   noted  The fetal cranium appeared normal in shape and the nuchal   translucency was normal in size at 2 0 mm  The nasal bone appears to be   present  The intracranial anatomy was unremarkable  Evaluation of the   spine revealed no obvious evidence for a neural tube defect  Anatomy of   the fetal thorax appeared within normal limits  The cardiac rhythm was   regular  The four chamber and 3 vessel tracheal views, and the cardiac   axis, appear normal   Within the abdomen, stomach & bladder were   visualized and the abdominal wall appeared intact  A three vessel cord   appears to be present  Active movement of the fetal body & extremities   was seen  There is no suspicion of a subchorionic bleed  The placental   cord insertion was normal   The uterine artery Dopplers are normal for   this gestational age  There is no suspicion of a uterine myoma  Free fluid   is not seen in the posterior cul-de-sac  ADNEXA      The left ovary appeared normal and measured 2 9 x 3 0 x 2 0 cm with a   volume of 9 1 cc  The right ovary appeared normal and measured 3 1 x 3 0 x   2 3 cm with a volume of 11 2 cc  AMNIOTIC FLUID         Largest Vertical Pocket = 3 6 cm   Amniotic Fluid: Normal      IMPRESSION      [facility]a IUP   13 weeks and 2 days by this ultrasound  (ERIC=JAN 11 2017)   Variable presentation   Fetal growth appeared normal   Regular fetal heart rate of 154 bpm   Anterior placenta      GENERAL COMMENT      Today's ultrasound findings and suggested follow-up were discussed in   detail with Vipul Raza  The Sequential Screen was discussed in detail,   including the sensitivities for detection of Down syndrome, Trisomy 18,   and open neural tube defects  Definitive prenatal diagnosis is possible   only through genetic amniocentesis or CVS   Vipul Raza underwent fingerstick   blood collection for hCG and MICHELINE-A to complete the initial component of   the Sequential Screen  Results should be available within one week  Follow-up multiple marker serum screening at 16-18 weeks gestation is   recommended to complete the Sequential Screen  Fetal Level II ultrasound   imaging is scheduled at about 20 weeks gestation  The face to face time, in addition to time spent discussing ultrasound   results, was 10 minutes, greater than 50% of which was spent during   counseling and coordination of care  SURESH Herrera M D  Maternal-Fetal Medicine   Electronically signed 07/09/16 18:25           Electronically signed by:Christopher Andi Moritz M D    Jul 11 2016  1:00PM EST

## 2018-01-16 NOTE — PROGRESS NOTES
SEP 1 2016         RE: Everardo Mckenzie                                 To: [facility]  MR#: [id number]                                    [address]   :  [address]   ENC: [id number]:JUAN DAVID Cole, [address]   (Exam #: [id number])                           Fax: [phone]      The LMP of this 28year old,  G1, P0-0-0-0 patient was 2016, giving   her an ERIC of 2017 and a current gestational age of 21 weeks 0 days   by dates  A sonographic examination was performed on SEP 1 2016 using real   time equipment  The ultrasound examination was performed using abdominal &   vaginal techniques  The patient has a BMI of 31 2  Her blood pressure   today was 109/59  Earliest ultrasound found in her record: 16 12w1d  01/10/17  ERIC      Cara Ramírez reports fetal movements  She experiences an occasional small   amount of vaginal bleeding which she attributes to a cervical polyp  Her   recently completed Sequential Screen revealed a Down syndrome risk of   1:8,700, Trisomy 18 risk of 1:10,000, and ONTD risk of 1:6,000  Evaluation   of the individual analyte levels reveals no increased risk for abnormal   placentation        Cardiac motion was observed at 149 bpm       INDICATIONS      fetal anatomical survey   first trimester bleeding   obesity   second trimester bleeding      Exam Types      LEVEL II   Transvaginal      RESULTS      Fetus # 1 of 1   Breech presentation   Fetal growth appeared normal   Placenta Location = Anterior   No placenta previa   Placenta Grade = I      MEASUREMENTS (* Included In Average GA)      AC              18 1 cm        22 weeks 5 days* (81%)   BPD              5 0 cm        21 weeks 0 days* (47%)   HC              19 7 cm        21 weeks 6 days* (68%)   Femur            3 7 cm        21 weeks 6 days* (64%)      Nuchal Fold      4 2 mm      Humerus          3 6 cm        22 weeks 4 days  (81%)   Radius           3 0 cm        22 weeks 5 days   Ulna             3 2 cm        22 weeks 2 days   Tibia            3 2 cm        21 weeks 4 days  (56%)   Fibula           3 2 cm        21 weeks 0 days   Foot             4 2 cm        23 weeks 3 days      Cerebellum       2 3 cm        22 weeks 2 days   Biorbit          3 3 cm        21 weeks 2 days   CisternaMagna    4 1 mm      HC/AC           1 09   FL/AC           0 20   FL/BPD          0 74   EFW (Ac/Fl/Hc)   494 grams - 1 lbs 1 oz      THE AVERAGE GESTATIONAL AGE is 21 weeks 6 days +/- 10 days  AMNIOTIC FLUID         Largest Vertical Pocket = 3 5 cm   Amniotic Fluid: Normal      CERVICAL EVALUATION      The cervix appeared normal (Ultrasound Examination)  SUPINE      Cervical Length: 3 40 cm      OTHER TEST RESULTS           Funneling?: No             Dynamic Changes?: No        Resp  To TFP?: No                      Debris?: No      ANATOMY      Head                                    Normal   Face/Neck                               Normal   Th  Cav  Normal   Heart                                   Normal   Abd  Cav  Normal   Stomach                                 Normal   Right Kidney                            Normal   Left Kidney                             Normal   Bladder                                 Normal   Abd  Wall                               Normal   Spine                                   Normal   Extrems                                 Normal   Genitalia                               Normal   Placenta                                Normal   Umbl   Cord                              Normal   Uterus                                  Normal   PCI                                     Normal      ANATOMY DETAILS      Visualized Appearing Sonographically Normal:   HEAD: (Calvarium, BPD Level, Cavum, Lateral Ventricles, Choroid Plexus,   Cerebellum, Cisterna Magna);    FACE/NECK: (Neck, Nuchal Fold, Profile,   Orbits, Nose/Lips, Palate, Face);    TH  CAV : (Diaphragm); HEART:   (Four Chamber View, Proximal Left Outflow, Proximal Right Outflow, 3   Vessel Trachea, Short Axis of Greater Vessels, Ductal Arch, Aortic Arch,   Interventricular Septum, Interatrial Septum, IVC, SVC, Cardiac Axis,   Cardiac Position);    ABD  CAV , STOMACH, RIGHT KIDNEY, LEFT KIDNEY,   BLADDER, ABD  WALL, SPINE: (Cervical Spine, Thoracic Spine, Lumbar Spine,   Sacrum);    EXTREMS: (Lt Humerus, Rt Humerus, Lt Forearm, Rt Forearm, Lt   Hand, Rt Hand, Lt Femur, Rt Femur, Lt Low Leg, Rt Low Leg, Lt Foot, Rt   Foot);    GENITALIA (Female), PLACENTA, UMBL  CORD, UTERUS, PCI      ADNEXA      The left ovary appeared normal and measured 3 0 x 3 1 x 1 4 cm with a   volume of 6 8 cc  The right ovary appeared normal and measured 3 2 x 2 9 x   1 8 cm with a volume of 8 7 cc  IMPRESSION      Rivera IUP   21 weeks and 6 days by this ultrasound  (ERIC=JAN 6 2017)   Breech presentation   Fetal growth appeared normal   Normal anatomy survey   Regular fetal heart rate of 149 bpm   Anterior placenta   No placenta previa      GENERAL COMMENT      No fetal structural abnormality or ultrasound marker for aneuploidy is   identified on the Level II ultrasound study today  Fetal growth and   amniotic fluid volume are normal   The placenta is normal in appearance  The cervix is normal in appearance by transvaginal sonography  Today's ultrasound findings and suggested follow-up were discussed in   detail with Que Shi  We discussed that prenatal ultrasound cannot rule   out all congenital abnormalities  Her Sequential Screen results were   discussed in detail  Que Shi will return to the [facility]  at about   32 weeks gestation to assess interval growth for the indications of   obesity and intermittent vaginal bleeding        The face to face time, in addition to time spent discussing ultrasound results, was 10 minutes, greater than 50% of which was spent during   counseling and coordination of care  SURESH Sandoval M D     Maternal-Fetal Medicine   Electronically signed 09/01/16 17:43

## 2018-01-16 NOTE — MISCELLANEOUS
Message  phone call from patient, coming for ultrasound and sequential screening appointment Friday July 8, patient had called her Insurance with CPT codes for coverage per our office letter, patient concerned as representative told her " testing needs to be medically necessary" to be covered  Spoke with [employer]  Saniya Davis, all claims are subject to review and no guarantee of payment can be assured patient prior to medical review   Patient is aware [facility] office can not guarantee payment for her  Discussed with patient and she knows her current co-pay and deductible  Active Problems    1  Abnormal uterine bleeding (626 9) (N93 9)   2  Asthma (493 90) (J45 909)   3  Cervical lesion (622 9) (N88 9)   4  Cervical polyp (622 7) (N84 1)   5  DUB (dysfunctional uterine bleeding) (626 8) (N93 8)   6  Encounter for routine gynecological examination (V72 31) (Z01 419)   7  Encounter for screening mammogram for malignant neoplasm of breast (V76 12)   (Z12 31)   8  Need for prophylactic vaccination with combined diphtheria-tetanus-pertussis (DTaP)   vaccine (V06 1) (Z23)   9  Need for rhogam due to Rh negative mother (V07 2) (O36 0990)   10  Pap smear, as part of routine gynecological examination (V76 2) (Z01 419)   11  Positive pregnancy test (V72 42) (Z32 01)   12  Postcoital bleeding (626 7) (N93 0)   13  Pregnancy, supervision of first (V22 0) (Z34 00)   14  Spotting (623 8) (N92 0)   15  Type a blood, rh negative (V49 89) (Z67 11)   16  Unspecified disorder of menstruation and other abnormal bleeding from female genital    tract (626 9) (N92 6,N93 9)    Current Meds   1  [facility]S Prenatal TABS; Therapy: (Recorded:48Pvh9687) to Recorded    Allergies    1  No Known Drug Allergies    2  No Known Environmental Allergies   3   No Known Food Allergies    Signatures   Electronically signed by : Nivia Desir, ; Jul 6 2016  3:05PM EST                       (Author)

## 2018-01-17 NOTE — PROGRESS NOTES
Chief Complaint  Patient is here for first Depo Provera Injection  Injection was given in the Right Buttock without any difficulty  She is still taking her prenatals and breastfeeding  -KM    Depo Provera - Aaliyah Card  Lot: [id number]  Exp: 05/31/2021  NDC: 85711752899      Active Problems    1  Asthma (493 90) (J45 909)   2  Encounter for initial prescription of injectable contraceptive (V25 02) (Z30 013)   3  Encounter for management and injection of depo-Provera (V25 49) (Z30 42)   4  Postpartum exam (V24 2) (Z39 2)    Current Meds   1  CVS Prenatal TABS; Therapy: (Recorded:96Wpz5179) to Recorded   2  MedroxyPROGESTERone Acetate 150 MG/ML Intramuscular Suspension; INJECT   INTRAMUSCULARLY EVERY 12 WEEKS AS DIRECTED; Therapy: 97Rrx3565 to (Last Rx:00Kcg2390)  Requested for: 34Tsh6304   Ordered    Allergies    1  No Known Drug Allergies    2  Seasonal    Vitals  Signs    Systolic: 900, RUE, Sitting  Diastolic: 64, RUE, Sitting  Height: 5 ft 7 in  Weight: 184 lb   BMI Calculated: 28 82  BSA Calculated: 1 95  LMP: Breastfeeding    Plan  Encounter for management and injection of depo-Provera    · Depo-Provera 150 MG/ML Intramuscular Suspension  (MedroxyPROGESTERone Acetate)    Future Appointments    Date/Time Provider Specialty Site   05/26/2017 01:00 PM OSEAS Cohen   Obstetrics/Gynecology [facility]     Signatures   Electronically signed by : Nuvia Beck, [facility]; Mar  2 2017 11:04AM EST                       (Author)    Electronically signed by : OSEAS Ye ; Mar  2 2017  5:56PM EST                       (Author)

## 2018-01-18 NOTE — PROGRESS NOTES
Chief Complaint  Patient is here for her Depo Provera 150mg injection  Injection was given in the Right Buttock today without any difficulty  Patient is still breastfeeding and taking her Prenatal vitamins  - KM    Depo Provera - Pranav Banerjee  Lot: [id number]  Exp: 01/31/2020  NDC: [id number]      Active Problems    1  Asthma (493 90) (J45 909)   2  Encounter for gynecological examination without abnormal finding (V72 31) (Z01 419)   3  Encounter for management and injection of depo-Provera (V25 49) (Z30 42)   4  Irregular bleeding (626 4) (N92 6)    Current Meds   1  CVS Prenatal TABS; Therapy: (Recorded:11Rnf4254) to Recorded   2  MedroxyPROGESTERone Acetate 150 MG/ML Intramuscular Suspension; INJECT   INTRAMUSCULARLY EVERY 12 WEEKS AS DIRECTED; Therapy: 04MCD2205 to (Last Rx:02Jun2017)  Requested for: 02Jun2017   Ordered   3  MedroxyPROGESTERone Acetate 150 MG/ML Intramuscular Suspension; INJECT   INTRAMUSCULARLY EVERY 12 WEEKS AS DIRECTED; Therapy: 66Fgf0877 to (Last Rx:57Gop1246)  Requested for: 09Ubb0909   Ordered    Allergies    1  No Known Drug Allergies    2  Seasonal    Vitals  Signs    Systolic: 767, RUE, Sitting  Diastolic: 76, RUE, Sitting  Height: 5 ft 7 in  Weight: 181 lb   BMI Calculated: 28 35  BSA Calculated: 1 94  LMP: Depo    Plan  Encounter for management and injection of depo-Provera    · Depo-Provera 150 MG/ML Intramuscular Suspension  (MedroxyPROGESTERone Acetate)    Future Appointments    Date/Time Provider Specialty Site   08/03/2018 01:00 PM OSEAS Quigley  Obstetrics/Gynecology [facility]   11/10/2017 08:15 AM JOHN INJECTION, Nurse Schedule  [facility]     Signatures   Electronically signed by : OMARI Higgins;  Aug 24 2017  7:36AM EST                       (Author)

## 2018-01-18 NOTE — PROGRESS NOTES
Chief Complaint  Patient here for Depo injection  She also requested the Flu shot  Spoke with Caio Ramos to verify  Patient doing well on Depo and wishes to continue  She would like more refills to her pharmacy  Task to Dr Abhijeet Metz to refill  Depo:  NDC# [id number]  Lot# [id number]  Exp: 2/2020    Flu shot:  NDC# [id number]  Lot# [id number]  Exp: 6/30/2018    Given by: Myah Estevez MA  Active Problems    1  Asthma (493 90) (J45 909)   2  Encounter for gynecological examination without abnormal finding (V72 31) (Z01 419)   3  Encounter for management and injection of depo-Provera (V25 49) (Z30 42)   4  Irregular bleeding (626 4) (N92 6)    Current Meds   1  CVS Prenatal TABS; Therapy: (Recorded:63Lyd9643) to Recorded   2  MedroxyPROGESTERone Acetate 150 MG/ML Intramuscular Suspension; INJECT   INTRAMUSCULARLY EVERY 12 WEEKS AS DIRECTED; Therapy: 79EJP0537 to (Last Rx:02Jun2017)  Requested for: 02Jun2017   Ordered    Allergies    1  No Known Drug Allergies    2  Seasonal    Vitals  Signs    Systolic: 779, RUE, Sitting  Diastolic: 80, RUE, Sitting  Height: 5 ft 7 in  Weight: 180 lb   BMI Calculated: 28 19  BSA Calculated: 1 93  LMP: [place] 1822  Encounter for management and injection of depo-Provera    · MedroxyPROGESTERone Acetate 150 MG/ML Intramuscular Suspension    Future Appointments    Date/Time Provider Specialty Site   08/03/2018 01:00 PM OSEAS Gilmore   Obstetrics/Gynecology [facility]   02/02/2018 08:15 AM JOHN INJECTION, Nurse Schedule  [facility]     Signatures   Electronically signed by : OSEAS Tolliver ; Nov 13 2017  2:59PM EST                       (Acknowledgement)

## 2018-01-22 VITALS
DIASTOLIC BLOOD PRESSURE: 80 MMHG | BODY MASS INDEX: 28.25 KG/M2 | HEIGHT: 67 IN | WEIGHT: 180 LBS | SYSTOLIC BLOOD PRESSURE: 124 MMHG

## 2018-01-22 VITALS
BODY MASS INDEX: 33.59 KG/M2 | DIASTOLIC BLOOD PRESSURE: 72 MMHG | HEIGHT: 67 IN | WEIGHT: 214 LBS | SYSTOLIC BLOOD PRESSURE: 122 MMHG

## 2018-01-22 VITALS
DIASTOLIC BLOOD PRESSURE: 76 MMHG | HEIGHT: 67 IN | BODY MASS INDEX: 28.41 KG/M2 | WEIGHT: 181 LBS | SYSTOLIC BLOOD PRESSURE: 122 MMHG

## 2018-01-22 VITALS
DIASTOLIC BLOOD PRESSURE: 80 MMHG | HEIGHT: 67 IN | BODY MASS INDEX: 33.12 KG/M2 | WEIGHT: 211 LBS | SYSTOLIC BLOOD PRESSURE: 120 MMHG

## 2018-01-22 VITALS
BODY MASS INDEX: 28.88 KG/M2 | HEIGHT: 67 IN | WEIGHT: 184 LBS | DIASTOLIC BLOOD PRESSURE: 64 MMHG | SYSTOLIC BLOOD PRESSURE: 106 MMHG

## 2018-02-02 ENCOUNTER — CLINICAL SUPPORT (OUTPATIENT)
Dept: OBGYN CLINIC | Facility: CLINIC | Age: 33
End: 2018-02-02
Payer: COMMERCIAL

## 2018-02-02 VITALS
SYSTOLIC BLOOD PRESSURE: 98 MMHG | WEIGHT: 178.2 LBS | BODY MASS INDEX: 27.97 KG/M2 | HEIGHT: 67 IN | DIASTOLIC BLOOD PRESSURE: 50 MMHG

## 2018-02-02 DIAGNOSIS — Z30.42 ENCOUNTER FOR MANAGEMENT AND INJECTION OF DEPO-PROVERA: Primary | ICD-10-CM

## 2018-02-02 PROCEDURE — 96372 THER/PROPH/DIAG INJ SC/IM: CPT

## 2018-02-02 RX ORDER — MEDROXYPROGESTERONE ACETATE 150 MG/ML
150 INJECTION, SUSPENSION INTRAMUSCULAR
Status: DISCONTINUED | OUTPATIENT
Start: 2018-02-02 | End: 2021-08-27

## 2018-02-02 RX ORDER — MEDROXYPROGESTERONE ACETATE 150 MG/ML
INJECTION, SUSPENSION INTRAMUSCULAR
COMMUNITY
Start: 2017-06-02 | End: 2018-07-19 | Stop reason: SDUPTHER

## 2018-02-02 RX ADMIN — MEDROXYPROGESTERONE ACETATE 150 MG: 150 INJECTION, SUSPENSION INTRAMUSCULAR at 08:22

## 2018-02-02 NOTE — PROGRESS NOTES
Patient was here for her Depo Provera 150mg injection   Injection was given in the left buttocks with out difficulty  - SS

## 2018-03-07 NOTE — PROGRESS NOTES
[facility] Education 2nd Trimester:   Second Trimester Education provided: 28 week packet given         Signatures   Electronically signed by : Senait Puente DO; Nov 9 2016  2:19PM EST

## 2018-03-07 NOTE — PROGRESS NOTES
Education  [facility] 3rd Trimester: Third Trimester Education provided: benefits of breastfeeding, importance of exclusive breastfeeding, early initiation of breastfeeding, exclusive breastfeeding for the first 6 months, non-pharmacologic pain relief methods for labor and importance of early skin-to-skin contact  rooming-in on 24 hour basis   The patient is planning on breastfeeding  The patient is planning on exclusively breastfeeding until the baby is 10months of age  Prenatal education provided by: Carlos Marie PA-C Date: 10/28/16        Signatures   Electronically signed by : Carlos Marie, [facility]; Oct 28 2016  9:46AM EST                       (Author)

## 2018-04-23 NOTE — PROGRESS NOTES
Patient scheduled for depo provera injection  Last injection 2/2/18, tolerated well, last yearly exam was 7/28/17 with Dr Shanta Givens  Patient denies problems or concerns with depo provera, injection given IM in right buttock, tolerated well        Lot# [id number]  Exp 7/2020

## 2018-04-27 ENCOUNTER — CLINICAL SUPPORT (OUTPATIENT)
Dept: OBGYN CLINIC | Facility: CLINIC | Age: 33
End: 2018-04-27
Payer: COMMERCIAL

## 2018-04-27 VITALS
DIASTOLIC BLOOD PRESSURE: 70 MMHG | HEIGHT: 68 IN | BODY MASS INDEX: 26.83 KG/M2 | SYSTOLIC BLOOD PRESSURE: 112 MMHG | WEIGHT: 177 LBS

## 2018-04-27 DIAGNOSIS — Z30.42 ENCOUNTER FOR MANAGEMENT AND INJECTION OF DEPO-PROVERA: Primary | ICD-10-CM

## 2018-04-27 PROCEDURE — 96372 THER/PROPH/DIAG INJ SC/IM: CPT | Performed by: OBSTETRICS & GYNECOLOGY

## 2018-04-27 RX ORDER — MULTIVITAMIN
TABLET ORAL
COMMUNITY
End: 2020-11-20 | Stop reason: ALTCHOICE

## 2018-04-27 RX ADMIN — MEDROXYPROGESTERONE ACETATE 150 MG: 150 INJECTION, SUSPENSION INTRAMUSCULAR at 09:43

## 2018-07-19 DIAGNOSIS — IMO0001 BIRTH CONTROL: Primary | ICD-10-CM

## 2018-07-19 RX ORDER — MEDROXYPROGESTERONE ACETATE 150 MG/ML
INJECTION, SUSPENSION INTRAMUSCULAR
Qty: 1 ML | Refills: 0 | Status: SHIPPED | OUTPATIENT
Start: 2018-07-19 | End: 2018-10-08 | Stop reason: SDUPTHER

## 2018-07-20 ENCOUNTER — CLINICAL SUPPORT (OUTPATIENT)
Dept: OBGYN CLINIC | Facility: CLINIC | Age: 33
End: 2018-07-20
Payer: COMMERCIAL

## 2018-07-20 VITALS — SYSTOLIC BLOOD PRESSURE: 110 MMHG | DIASTOLIC BLOOD PRESSURE: 66 MMHG | WEIGHT: 176.5 LBS | BODY MASS INDEX: 26.84 KG/M2

## 2018-07-20 DIAGNOSIS — Z30.42 ENCOUNTER FOR MANAGEMENT AND INJECTION OF DEPO-PROVERA: Primary | ICD-10-CM

## 2018-07-20 PROCEDURE — 96372 THER/PROPH/DIAG INJ SC/IM: CPT

## 2018-07-20 RX ADMIN — MEDROXYPROGESTERONE ACETATE 150 MG: 150 INJECTION, SUSPENSION INTRAMUSCULAR at 09:43

## 2018-08-03 ENCOUNTER — ANNUAL EXAM (OUTPATIENT)
Dept: OBGYN CLINIC | Facility: CLINIC | Age: 33
End: 2018-08-03
Payer: COMMERCIAL

## 2018-08-03 VITALS
DIASTOLIC BLOOD PRESSURE: 74 MMHG | WEIGHT: 177 LBS | HEIGHT: 67 IN | BODY MASS INDEX: 27.78 KG/M2 | SYSTOLIC BLOOD PRESSURE: 118 MMHG

## 2018-08-03 DIAGNOSIS — Z01.419 ENCOUNTER FOR GYNECOLOGICAL EXAMINATION (GENERAL) (ROUTINE) WITHOUT ABNORMAL FINDINGS: Primary | ICD-10-CM

## 2018-08-03 PROCEDURE — S0612 ANNUAL GYNECOLOGICAL EXAMINA: HCPCS | Performed by: OBSTETRICS & GYNECOLOGY

## 2018-08-03 NOTE — PROGRESS NOTES
Assessment/Plan:      Diagnoses and all orders for this visit:    Encounter for gynecological examination (general) (routine) without abnormal findings        PAP up to date  Subjective:     Patient ID: Alejandrina Coffman is a 29 y o  female  Is a 27-year-old female para 1, Depo-Provera, here for yearly gynecologic exam without complaint  Review of systems is negative for vulvar vaginal or anal irritation  Negative for pelvic or abdominal pain  Negative for breast complaints  Past medical history is significant for allergies  Past surgical history significant for wisdom teeth and Lasix  Current medications are prenatal vitamins and Depo-Provera  No known drug allergies  Family history significant for maternal grandmother with breast cancer in her 76s  Gynecologic Exam   The patient's pertinent negatives include no pelvic pain  Pertinent negatives include no abdominal pain  Review of Systems   Gastrointestinal: Negative for abdominal pain and rectal pain  Genitourinary: Negative for genital sores, pelvic pain and vaginal pain  Objective:     Physical Exam   Constitutional: She is oriented to person, place, and time  She appears well-developed and well-nourished  HENT:   Head: Normocephalic and atraumatic  Eyes: Pupils are equal, round, and reactive to light  Neck: Neck supple  No thyromegaly present  Cardiovascular: Normal rate and regular rhythm  Pulmonary/Chest: Effort normal    Abdominal: Soft  She exhibits no distension and no mass  There is no tenderness  There is no rebound and no guarding  Genitourinary: Vagina normal and uterus normal    Neurological: She is alert and oriented to person, place, and time  Skin: Skin is warm  Psychiatric: She has a normal mood and affect   Her behavior is normal  Thought content normal

## 2018-10-08 DIAGNOSIS — IMO0001 BIRTH CONTROL: ICD-10-CM

## 2018-10-09 RX ORDER — MEDROXYPROGESTERONE ACETATE 150 MG/ML
INJECTION, SUSPENSION INTRAMUSCULAR
Qty: 1 SYRINGE | Refills: 3 | Status: SHIPPED | OUTPATIENT
Start: 2018-10-09 | End: 2019-09-13 | Stop reason: SDUPTHER

## 2018-10-10 NOTE — PROGRESS NOTES
Pt scheduled for Depo-Provera injection  Pt previously received Depo injection on 7/20/18 via IM in right buttock, Pt tolerated injection well  Pt was prescribed Depo on 10/9/18 by SUMMER Kaye (inject Depo every 12 weeks)  Pt scheduled for yearly exam with Dr Luis Dolan on 8/9/2019 at [facility]

## 2018-10-12 ENCOUNTER — CLINICAL SUPPORT (OUTPATIENT)
Dept: OBGYN CLINIC | Facility: CLINIC | Age: 33
End: 2018-10-12
Payer: COMMERCIAL

## 2018-10-12 VITALS — DIASTOLIC BLOOD PRESSURE: 82 MMHG | BODY MASS INDEX: 28.2 KG/M2 | SYSTOLIC BLOOD PRESSURE: 118 MMHG | WEIGHT: 181.4 LBS

## 2018-10-12 DIAGNOSIS — Z30.42 ENCOUNTER FOR DEPO-PROVERA CONTRACEPTION: ICD-10-CM

## 2018-10-12 PROCEDURE — 96372 THER/PROPH/DIAG INJ SC/IM: CPT | Performed by: PHYSICIAN ASSISTANT

## 2018-10-12 RX ADMIN — MEDROXYPROGESTERONE ACETATE 150 MG: 150 INJECTION, SUSPENSION INTRAMUSCULAR at 12:59

## 2019-01-04 ENCOUNTER — CLINICAL SUPPORT (OUTPATIENT)
Dept: OBGYN CLINIC | Facility: CLINIC | Age: 34
End: 2019-01-04
Payer: COMMERCIAL

## 2019-01-04 VITALS
HEIGHT: 68 IN | DIASTOLIC BLOOD PRESSURE: 68 MMHG | WEIGHT: 185.8 LBS | BODY MASS INDEX: 28.16 KG/M2 | SYSTOLIC BLOOD PRESSURE: 98 MMHG

## 2019-01-04 DIAGNOSIS — Z30.42 ENCOUNTER FOR MANAGEMENT AND INJECTION OF DEPO-PROVERA: ICD-10-CM

## 2019-01-04 PROCEDURE — 96372 THER/PROPH/DIAG INJ SC/IM: CPT | Performed by: NURSE PRACTITIONER

## 2019-01-04 RX ADMIN — MEDROXYPROGESTERONE ACETATE 150 MG: 150 INJECTION, SUSPENSION INTRAMUSCULAR at 13:04

## 2019-01-04 NOTE — PROGRESS NOTES
Pt presents for depo injection  Administered without difficulty  Right gluteus    Lot #  [id number]   Exp 4/30/2023

## 2019-03-15 ENCOUNTER — TELEPHONE (OUTPATIENT)
Dept: OBGYN CLINIC | Facility: CLINIC | Age: 34
End: 2019-03-15

## 2019-03-15 NOTE — TELEPHONE ENCOUNTER
Pt has questions regarding her depo,  On 2 yrs, for the past mth she has not felt like herself, tired, mood swings, pls call & advise,  thanks

## 2019-03-15 NOTE — TELEPHONE ENCOUNTER
Spoke with Pt today via phone call  Pt informed that she can experience fatigue, worsening mood, and mood swings while using Depo-Provera for birth control due to said medication being hormonal birth control (Progestin)  Pt further informed that with hormonal birth control there can be a period of Pt's body adjusting to hormones  Pt states she really likes Depo-Provera, will continue to with Depo injections  Pt advised to continue to monitor symptoms  Reiterated to Pt that if her symptoms worsen or do not improve to contact office

## 2019-03-20 ENCOUNTER — OFFICE VISIT (OUTPATIENT)
Dept: FAMILY MEDICINE CLINIC | Facility: CLINIC | Age: 34
End: 2019-03-20
Payer: COMMERCIAL

## 2019-03-20 VITALS
HEART RATE: 68 BPM | SYSTOLIC BLOOD PRESSURE: 110 MMHG | WEIGHT: 187 LBS | BODY MASS INDEX: 28.34 KG/M2 | OXYGEN SATURATION: 98 % | RESPIRATION RATE: 16 BRPM | HEIGHT: 68 IN | DIASTOLIC BLOOD PRESSURE: 68 MMHG | TEMPERATURE: 100.1 F

## 2019-03-20 DIAGNOSIS — E55.9 VITAMIN D DEFICIENCY: ICD-10-CM

## 2019-03-20 DIAGNOSIS — D64.9 LOW HEMATOCRIT: ICD-10-CM

## 2019-03-20 DIAGNOSIS — R45.86 MOOD CHANGES: ICD-10-CM

## 2019-03-20 DIAGNOSIS — R53.82 CHRONIC FATIGUE: Primary | ICD-10-CM

## 2019-03-20 PROCEDURE — 3008F BODY MASS INDEX DOCD: CPT | Performed by: FAMILY MEDICINE

## 2019-03-20 PROCEDURE — 99214 OFFICE O/P EST MOD 30 MIN: CPT | Performed by: FAMILY MEDICINE

## 2019-03-20 RX ORDER — CHOLECALCIFEROL (VITAMIN D3) 125 MCG
500 CAPSULE ORAL DAILY
COMMUNITY
End: 2020-02-28

## 2019-03-20 RX ORDER — AMOXICILLIN 500 MG/1
CAPSULE ORAL
COMMUNITY
Start: 2019-03-10 | End: 2019-03-29 | Stop reason: ALTCHOICE

## 2019-03-20 NOTE — PROGRESS NOTES
Chief Complaint   Patient presents with    Medication Management     Discuss Birth Control     Health Maintenance   Topic Date Due    BMI: Followup Plan  08/01/2004    INFLUENZA VACCINE  08/20/2019 (Originally 7/1/2018)    Depression Screening PHQ  03/20/2020    BMI: Adult  03/20/2020    DTaP,Tdap,and Td Vaccines (4 - Td) 10/28/2026    HEPATITIS B VACCINES  Aged Out     Assessment/Plan:    Chronic fatigue  Will check labs to rule out anemia, thyroid disease  Continue taking vitamins  Recommended to follow a well-balanced diet, regular exercise  Mood changes  Check labs to rule out thyroid disease  Consider psychotherapy  Will call patient with blood work results  Diagnoses and all orders for this visit:    Chronic fatigue  -     CBC and differential; Future  -     Comprehensive metabolic panel; Future  -     T4, free; Future  -     TSH, 3rd generation; Future    Mood changes  -     T4, free; Future  -     TSH, 3rd generation; Future    Low hematocrit  -     Iron Panel; Future    Vitamin D deficiency  -     Vitamin D 25 hydroxy; Future    Other orders  -     amoxicillin (AMOXIL) 500 mg capsule  -     cyanocobalamin (VITAMIN B-12) 500 mcg tablet; Take 500 mcg by mouth daily          Subjective:      Patient ID: Brice Villarreal is a 29 y o  female  HPI     Patient presents to the office c/o mood swings, mood irritability, feeling tired for the last few months  Patient has 3year-old daughter  She is Depo-Provera injections every 3 months as per gynecologist for the last 2 years  Denies prior history of anxiety, depression  She takes Melatonin 5 mg at bedtime for insomnia  Denies chest pain, shortness of breath, dizziness  Patient tries to exercise on a regular basis, goes to the gym 4 times per week  She tries to lose weight  No suicidal ideations  Denies anxiety attacks  Denies family history of mental disorders  Patient had normal Pap smear in 2017  She is due for regular pelvic exam with gynecologist in  August this year  Currently taking prenatal vitamins, started taking   Vit B12  Last blood test done in 1 17  Hb was 10 5  No prior H/o thyroid disease  Denies tobacco, alcohol, drug use  The following portions of the patient's history were reviewed and updated as appropriate: allergies, past family history, past medical history, past social history, past surgical history and problem list     Review of Systems   Constitutional: Positive for fatigue  Negative for activity change, appetite change, chills and fever  HENT: Negative for congestion, mouth sores, nosebleeds, sore throat and trouble swallowing  Eyes: Negative for pain, discharge, redness, itching and visual disturbance  Respiratory: Negative for cough, chest tightness, shortness of breath and wheezing  Cardiovascular: Negative for chest pain, palpitations and leg swelling  Gastrointestinal: Negative for abdominal pain, blood in stool, constipation, diarrhea, nausea and vomiting  Genitourinary: Negative for difficulty urinating, dysuria, flank pain, frequency, hematuria, pelvic pain, vaginal bleeding and vaginal discharge  Musculoskeletal: Negative for arthralgias, back pain, joint swelling, myalgias and neck pain  Skin: Negative for rash and wound  Neurological: Negative for dizziness and headaches  Hematological: Negative  Psychiatric/Behavioral: Positive for sleep disturbance  Negative for behavioral problems and suicidal ideas  Mood swings, mood iiritability         Objective:      /68 (BP Location: Left arm, Patient Position: Sitting, Cuff Size: Adult)   Pulse 68   Temp 100 1 °F (37 8 °C) (Tympanic)   Resp 16   Ht 5' 7 5" (1 715 m)   Wt 84 8 kg (187 lb)   SpO2 98%   BMI 28 86 kg/m²          Physical Exam   Constitutional: She appears well-developed and well-nourished  HENT:   Head: Normocephalic and atraumatic     Right Ear: External ear normal    Left Ear: External ear normal    Mouth/Throat: Oropharynx is clear and moist    Eyes: Pupils are equal, round, and reactive to light  Conjunctivae are normal    Neck: Normal range of motion  Neck supple  No thyromegaly present  Cardiovascular: Normal rate, regular rhythm and normal heart sounds  No murmur heard  No BL LE edema   Pulmonary/Chest: Effort normal and breath sounds normal    Abdominal: Soft  Bowel sounds are normal  There is no tenderness  Musculoskeletal: Normal range of motion  She exhibits no edema, tenderness or deformity  Lymphadenopathy:     She has no cervical adenopathy  Skin: Skin is warm and dry  No rash noted  Psychiatric: She has a normal mood and affect  Her behavior is normal    Vitals reviewed

## 2019-03-20 NOTE — ASSESSMENT & PLAN NOTE
Will check labs to rule out anemia, thyroid disease  Continue taking vitamins  Recommended to follow a well-balanced diet, regular exercise

## 2019-03-22 ENCOUNTER — LAB (OUTPATIENT)
Dept: LAB | Facility: CLINIC | Age: 34
End: 2019-03-22
Payer: COMMERCIAL

## 2019-03-22 DIAGNOSIS — R53.82 CHRONIC FATIGUE: ICD-10-CM

## 2019-03-22 DIAGNOSIS — D64.9 LOW HEMATOCRIT: ICD-10-CM

## 2019-03-22 DIAGNOSIS — E55.9 VITAMIN D DEFICIENCY: ICD-10-CM

## 2019-03-22 DIAGNOSIS — R45.86 MOOD CHANGES: ICD-10-CM

## 2019-03-22 LAB
25(OH)D3 SERPL-MCNC: 57.5 NG/ML (ref 30–100)
ALBUMIN SERPL BCP-MCNC: 3.7 G/DL (ref 3.5–5)
ALP SERPL-CCNC: 74 U/L (ref 46–116)
ALT SERPL W P-5'-P-CCNC: 26 U/L (ref 12–78)
ANION GAP SERPL CALCULATED.3IONS-SCNC: 3 MMOL/L (ref 4–13)
AST SERPL W P-5'-P-CCNC: 19 U/L (ref 5–45)
BASOPHILS # BLD AUTO: 0.04 THOUSANDS/ΜL (ref 0–0.1)
BASOPHILS NFR BLD AUTO: 1 % (ref 0–1)
BILIRUB SERPL-MCNC: 0.66 MG/DL (ref 0.2–1)
BUN SERPL-MCNC: 12 MG/DL (ref 5–25)
CALCIUM SERPL-MCNC: 8.4 MG/DL (ref 8.3–10.1)
CHLORIDE SERPL-SCNC: 108 MMOL/L (ref 100–108)
CO2 SERPL-SCNC: 28 MMOL/L (ref 21–32)
CREAT SERPL-MCNC: 0.84 MG/DL (ref 0.6–1.3)
EOSINOPHIL # BLD AUTO: 0.13 THOUSAND/ΜL (ref 0–0.61)
EOSINOPHIL NFR BLD AUTO: 2 % (ref 0–6)
ERYTHROCYTE [DISTWIDTH] IN BLOOD BY AUTOMATED COUNT: 13.5 % (ref 11.6–15.1)
FERRITIN SERPL-MCNC: 138 NG/ML (ref 8–388)
GFR SERPL CREATININE-BSD FRML MDRD: 92 ML/MIN/1.73SQ M
GLUCOSE P FAST SERPL-MCNC: 84 MG/DL (ref 65–99)
HCT VFR BLD AUTO: 40.2 % (ref 34.8–46.1)
HGB BLD-MCNC: 13.5 G/DL (ref 11.5–15.4)
IMM GRANULOCYTES # BLD AUTO: 0.01 THOUSAND/UL (ref 0–0.2)
IMM GRANULOCYTES NFR BLD AUTO: 0 % (ref 0–2)
IRON SATN MFR SERPL: 41 %
IRON SERPL-MCNC: 116 UG/DL (ref 50–170)
LYMPHOCYTES # BLD AUTO: 1.94 THOUSANDS/ΜL (ref 0.6–4.47)
LYMPHOCYTES NFR BLD AUTO: 27 % (ref 14–44)
MCH RBC QN AUTO: 29.1 PG (ref 26.8–34.3)
MCHC RBC AUTO-ENTMCNC: 33.6 G/DL (ref 31.4–37.4)
MCV RBC AUTO: 87 FL (ref 82–98)
MONOCYTES # BLD AUTO: 0.49 THOUSAND/ΜL (ref 0.17–1.22)
MONOCYTES NFR BLD AUTO: 7 % (ref 4–12)
NEUTROPHILS # BLD AUTO: 4.46 THOUSANDS/ΜL (ref 1.85–7.62)
NEUTS SEG NFR BLD AUTO: 63 % (ref 43–75)
NRBC BLD AUTO-RTO: 0 /100 WBCS
PLATELET # BLD AUTO: 225 THOUSANDS/UL (ref 149–390)
PMV BLD AUTO: 10.5 FL (ref 8.9–12.7)
POTASSIUM SERPL-SCNC: 4.1 MMOL/L (ref 3.5–5.3)
PROT SERPL-MCNC: 7.1 G/DL (ref 6.4–8.2)
RBC # BLD AUTO: 4.64 MILLION/UL (ref 3.81–5.12)
SODIUM SERPL-SCNC: 139 MMOL/L (ref 136–145)
T4 FREE SERPL-MCNC: 0.94 NG/DL (ref 0.76–1.46)
TIBC SERPL-MCNC: 281 UG/DL (ref 250–450)
TSH SERPL DL<=0.05 MIU/L-ACNC: 2.26 UIU/ML (ref 0.36–3.74)
WBC # BLD AUTO: 7.07 THOUSAND/UL (ref 4.31–10.16)

## 2019-03-22 PROCEDURE — 83550 IRON BINDING TEST: CPT

## 2019-03-22 PROCEDURE — 85025 COMPLETE CBC W/AUTO DIFF WBC: CPT

## 2019-03-22 PROCEDURE — 36415 COLL VENOUS BLD VENIPUNCTURE: CPT

## 2019-03-22 PROCEDURE — 84439 ASSAY OF FREE THYROXINE: CPT

## 2019-03-22 PROCEDURE — 82728 ASSAY OF FERRITIN: CPT

## 2019-03-22 PROCEDURE — 84443 ASSAY THYROID STIM HORMONE: CPT

## 2019-03-22 PROCEDURE — 83540 ASSAY OF IRON: CPT

## 2019-03-22 PROCEDURE — 80053 COMPREHEN METABOLIC PANEL: CPT

## 2019-03-22 PROCEDURE — 82306 VITAMIN D 25 HYDROXY: CPT

## 2019-03-27 NOTE — PROGRESS NOTES
Pt scheduled for Depo-Provera injection (3/29/19)  Pt previously received Depo injection on 1/4/19 via IM in right buttock, administered without difficulty per nurse note  Pt is scheduled for yearly exam with Dr Joselo Palacios on 8/16/19  SUMMER Kaye is prescribing provider for Pt's Depo-Provera medication

## 2019-03-29 ENCOUNTER — CLINICAL SUPPORT (OUTPATIENT)
Dept: OBGYN CLINIC | Facility: CLINIC | Age: 34
End: 2019-03-29
Payer: COMMERCIAL

## 2019-03-29 VITALS — BODY MASS INDEX: 29.29 KG/M2 | WEIGHT: 189.8 LBS | DIASTOLIC BLOOD PRESSURE: 68 MMHG | SYSTOLIC BLOOD PRESSURE: 114 MMHG

## 2019-03-29 DIAGNOSIS — Z30.42 ENCOUNTER FOR SURVEILLANCE OF INJECTABLE CONTRACEPTIVE: Primary | ICD-10-CM

## 2019-03-29 PROBLEM — Z67.91 RH NEGATIVE STATUS DURING PREGNANCY: Status: RESOLVED | Noted: 2017-01-11 | Resolved: 2019-03-29

## 2019-03-29 PROBLEM — O42.90 SPROM (PROLONGED SPONTANEOUS RUPTURE OF MEMBRANES): Status: RESOLVED | Noted: 2017-01-11 | Resolved: 2019-03-29

## 2019-03-29 PROBLEM — O26.899 RH NEGATIVE STATUS DURING PREGNANCY: Status: RESOLVED | Noted: 2017-01-11 | Resolved: 2019-03-29

## 2019-03-29 PROCEDURE — 96372 THER/PROPH/DIAG INJ SC/IM: CPT | Performed by: OBSTETRICS & GYNECOLOGY

## 2019-03-29 RX ORDER — MEDROXYPROGESTERONE ACETATE 150 MG/ML
150 INJECTION, SUSPENSION INTRAMUSCULAR
Status: SHIPPED | OUTPATIENT
Start: 2019-03-29

## 2019-03-29 RX ADMIN — MEDROXYPROGESTERONE ACETATE 150 MG: 150 INJECTION, SUSPENSION INTRAMUSCULAR at 13:24

## 2019-03-29 NOTE — PROGRESS NOTES
Patient was seen today for her depo provera injection  Injection was given in the left buttock with no difficulty  No complaints or concerns at this time   -    Depo Provera - ruth  Lot #: [id number]  Exp: 04/30/2023    Ml 47: [id number]

## 2019-06-21 ENCOUNTER — CLINICAL SUPPORT (OUTPATIENT)
Dept: OBGYN CLINIC | Facility: CLINIC | Age: 34
End: 2019-06-21
Payer: COMMERCIAL

## 2019-06-21 VITALS — BODY MASS INDEX: 28.86 KG/M2 | DIASTOLIC BLOOD PRESSURE: 64 MMHG | WEIGHT: 187 LBS | SYSTOLIC BLOOD PRESSURE: 124 MMHG

## 2019-06-21 DIAGNOSIS — IMO0001 BIRTH CONTROL: Primary | ICD-10-CM

## 2019-06-21 PROCEDURE — 96372 THER/PROPH/DIAG INJ SC/IM: CPT | Performed by: PHYSICIAN ASSISTANT

## 2019-06-21 RX ADMIN — MEDROXYPROGESTERONE ACETATE 150 MG: 150 INJECTION, SUSPENSION INTRAMUSCULAR at 14:25

## 2019-08-16 ENCOUNTER — ANNUAL EXAM (OUTPATIENT)
Dept: OBGYN CLINIC | Facility: CLINIC | Age: 34
End: 2019-08-16
Payer: COMMERCIAL

## 2019-08-16 VITALS
WEIGHT: 183.8 LBS | RESPIRATION RATE: 14 BRPM | BODY MASS INDEX: 27.86 KG/M2 | DIASTOLIC BLOOD PRESSURE: 66 MMHG | SYSTOLIC BLOOD PRESSURE: 122 MMHG | HEIGHT: 68 IN

## 2019-08-16 DIAGNOSIS — Z01.419 ENCOUNTER FOR GYNECOLOGICAL EXAMINATION WITHOUT ABNORMAL FINDING: Primary | ICD-10-CM

## 2019-08-16 PROCEDURE — S0612 ANNUAL GYNECOLOGICAL EXAMINA: HCPCS | Performed by: OBSTETRICS & GYNECOLOGY

## 2019-08-16 NOTE — PROGRESS NOTES
Assessment/Plan:      There are no diagnoses linked to this encounter  Subjective:     Patient ID: Freda Ann is a 36 y o  female  Patient is a 78-year-old female, para 1, Depo-Provera, here for yearly gynecologic without complaint  Patient is amenorrheic secondary to Depo-Provera  Review of systems is negative for vulvar vaginal or anal irritation  Negative for pelvic or abdominal pain  Negative for breast complaints  Negative for blood in stool or urine  Past medical history is negative  Past surgical history significant for wisdom teeth extraction and Lasix  Current medications are Depo-Provera calcium vitamin-D 3 and a multivitamin  No known drug allergies  Review of Systems   Gastrointestinal: Negative for abdominal pain and blood in stool  Genitourinary: Negative for genital sores, hematuria, menstrual problem, pelvic pain, vaginal discharge and vaginal pain  Objective:     Physical Exam   Constitutional: She is oriented to person, place, and time  She appears well-developed and well-nourished  HENT:   Head: Normocephalic  Neck: No thyromegaly present  Cardiovascular: Normal rate, regular rhythm and normal heart sounds  Pulmonary/Chest: Effort normal and breath sounds normal  Right breast exhibits no inverted nipple, no mass, no nipple discharge, no skin change and no tenderness  Left breast exhibits no inverted nipple, no mass, no nipple discharge, no skin change and no tenderness  Abdominal: Soft  She exhibits no distension and no mass  There is no tenderness  There is no rebound and no guarding  No hernia  Genitourinary: Vagina normal and uterus normal  Rectal exam shows no external hemorrhoid  There is no rash, tenderness, lesion or injury on the right labia  There is no rash, tenderness, lesion or injury on the left labia  Uterus is not deviated, not enlarged and not fixed  Cervix exhibits no motion tenderness, no discharge and no friability   Right adnexum displays no mass, no tenderness and no fullness  Left adnexum displays no mass, no tenderness and no fullness  No erythema, tenderness or bleeding in the vagina  No foreign body in the vagina  No signs of injury around the vagina  No vaginal discharge found  Neurological: She is alert and oriented to person, place, and time  Skin: Skin is warm and dry  Psychiatric: She has a normal mood and affect   Her behavior is normal

## 2019-09-09 DIAGNOSIS — IMO0001 BIRTH CONTROL: ICD-10-CM

## 2019-09-09 RX ORDER — MEDROXYPROGESTERONE ACETATE 150 MG/ML
INJECTION, SUSPENSION INTRAMUSCULAR
Qty: 1 SYRINGE | Refills: 3 | OUTPATIENT
Start: 2019-09-09

## 2019-09-11 ENCOUNTER — OFFICE VISIT (OUTPATIENT)
Dept: FAMILY MEDICINE CLINIC | Facility: CLINIC | Age: 34
End: 2019-09-11
Payer: COMMERCIAL

## 2019-09-11 ENCOUNTER — TELEPHONE (OUTPATIENT)
Dept: FAMILY MEDICINE CLINIC | Facility: CLINIC | Age: 34
End: 2019-09-11

## 2019-09-11 VITALS
RESPIRATION RATE: 16 BRPM | SYSTOLIC BLOOD PRESSURE: 128 MMHG | TEMPERATURE: 99 F | HEIGHT: 68 IN | DIASTOLIC BLOOD PRESSURE: 82 MMHG | WEIGHT: 182.6 LBS | OXYGEN SATURATION: 99 % | BODY MASS INDEX: 27.68 KG/M2 | HEART RATE: 74 BPM

## 2019-09-11 DIAGNOSIS — R53.82 CHRONIC FATIGUE: ICD-10-CM

## 2019-09-11 DIAGNOSIS — R51.9 DAILY HEADACHE: Primary | ICD-10-CM

## 2019-09-11 PROCEDURE — 3008F BODY MASS INDEX DOCD: CPT | Performed by: FAMILY MEDICINE

## 2019-09-11 PROCEDURE — 99214 OFFICE O/P EST MOD 30 MIN: CPT | Performed by: FAMILY MEDICINE

## 2019-09-11 RX ORDER — CETIRIZINE HYDROCHLORIDE 10 MG/1
10 TABLET, CHEWABLE ORAL DAILY
Qty: 30 TABLET | Refills: 0
Start: 2019-09-11

## 2019-09-11 RX ORDER — LANOLIN ALCOHOL/MO/W.PET/CERES
1 CREAM (GRAM) TOPICAL 2 TIMES DAILY
COMMUNITY
End: 2022-01-14 | Stop reason: ALTCHOICE

## 2019-09-11 NOTE — ASSESSMENT & PLAN NOTE
Presents with new onset of daily headaches for the past month  Will order CT scan of the head to rule out intracranial abnormality  Recommended to stay well hydrated  Take Ibuprofen or Tylenol PRN for headache  R/o sinus headaches  Start taking Zyrtec 10 mg daily for 2 weeks  Recommended to call with update on symptoms in 2 weeks  Call office or go to the emergency room if develops severe headache, dizziness

## 2019-09-11 NOTE — ASSESSMENT & PLAN NOTE
Patient had blood work done in March 2019 which showed no evidence of anemia, electrolyte imbalance, thyroid disease  Recommended to follow a well-balanced diet, regular exercise, stay well-hydrated

## 2019-09-11 NOTE — TELEPHONE ENCOUNTER
Authorization is pending approval, I faxed clinical information to insurance and they are processing it

## 2019-09-11 NOTE — PROGRESS NOTES
Chief Complaint   Patient presents with    Headache     x1 month     Health Maintenance   Topic Date Due    BMI: Followup Plan  08/01/2004    INFLUENZA VACCINE  07/01/2019    Depression Screening PHQ  03/20/2020    BMI: Adult  08/16/2020    DTaP,Tdap,and Td Vaccines (4 - Td) 10/28/2026    Pneumococcal Vaccine: 65+ Years (1 of 2 - PCV13) 08/01/2051    Pneumococcal Vaccine: Pediatrics (0 to 5 Years) and At-Risk Patients (6 to 59 Years)  Aged Out    HEPATITIS B VACCINES  Aged Out     BMI Counseling: Body mass index is 27 76 kg/m²  The BMI is above normal  Nutrition recommendations include reducing portion sizes, decreasing overall calorie intake, 3-5 servings of fruits/vegetables daily, reducing fast food intake, consuming healthier snacks, decreasing soda and/or juice intake, moderation in carbohydrate intake, increasing intake of lean protein, reducing intake of saturated fat and trans fat and reducing intake of cholesterol  Exercise recommendations include moderate aerobic physical activity for 150 minutes/week  Assessment/Plan:    Daily headache  Presents with new onset of daily headaches for the past month  Will order CT scan of the head to rule out intracranial abnormality  Recommended to stay well hydrated  Take Ibuprofen or Tylenol PRN for headache  R/o sinus headaches  Start taking Zyrtec 10 mg daily for 2 weeks  Recommended to call with update on symptoms in 2 weeks  Call office or go to the emergency room if develops severe headache, dizziness  Chronic fatigue  Patient had blood work done in March 2019 which showed no evidence of anemia, electrolyte imbalance, thyroid disease  Recommended to follow a well-balanced diet, regular exercise, stay well-hydrated       I have spent 25 minutes with Patient  today in which greater than 50% of this time was spent in counseling/coordination of care regarding Diagnostic results, Risks and benefits of tx options, Intructions for management, Patient and family education, Importance of tx compliance, Risk factor reductions and Impressions  Diagnoses and all orders for this visit:    Daily headache  -     CT head wo contrast; Future  -     cetirizine (ZyrTEC) 10 MG chewable tablet; Chew 1 tablet (10 mg total) daily    Chronic fatigue    Other orders  -     calcium citrate-vitamin D (CITRACAL+D) 315-200 MG-UNIT per tablet; Take 1 tablet by mouth 2 (two) times a day          Subjective:      Patient ID: Julia Silva is a 36 y o  female  HPI     Patient presents today c/o persistent daily headaches for the past month  Patient describes her headache as dull frontal headache 7/10 on pain scale moving up to her scalp, not associated with any nausea, vomiting or visual disturbances  She takes Ibuprofen with some relief in headache  Denies prior history of migraine or tension-type headaches  She went on vacation to [place] for 1 week last month but did not see any significant changes in symptoms  Patient does computer work  She does not wear glasses  She scheduled appointment with ophthalmologist on September 13, 2019 for eye exam       Patient states that she is usually sensitive to barometric pressure changes, had headaches in the past with weather changes  She does not take any allergy medications currently  Patient is on Depo-Provera are as per gynecology for birth control  Takes prenatal vitamins  Patient was seen in the office in March 2019 for evaluation of chronic fatigue  Blood work showed no evidence of anemia, thyroid disease  Vit D 22 OH level was normal       Patient denies family history of brain tumors or brain aneurysm  Denies tobacco, drug use  Drinks alcohol socially      The following portions of the patient's history were reviewed and updated as appropriate: allergies, current medications, past family history, past social history, past surgical history and problem list     Review of Systems   Constitutional: Positive for fatigue (chronic)  Negative for activity change, appetite change, chills and fever  HENT: Negative for congestion, dental problem, ear pain, hearing loss, mouth sores, nosebleeds, postnasal drip, sore throat, tinnitus and trouble swallowing  Eyes: Negative for pain, discharge, redness, itching and visual disturbance  Respiratory: Negative for cough, chest tightness, shortness of breath and wheezing  Cardiovascular: Negative for chest pain, palpitations and leg swelling  Gastrointestinal: Negative for abdominal pain, constipation, diarrhea, nausea and vomiting  Genitourinary: Negative for difficulty urinating, dysuria, flank pain, frequency, hematuria and pelvic pain  Musculoskeletal: Negative for arthralgias, back pain, gait problem, joint swelling, myalgias and neck pain  Skin: Negative for rash and wound  Neurological: Positive for headaches  Negative for dizziness, seizures, syncope, speech difficulty, weakness and numbness  Hematological: Negative  Psychiatric/Behavioral: Negative  Objective:      /82 (BP Location: Left arm, Patient Position: Sitting, Cuff Size: Standard)   Pulse 74   Temp 99 °F (37 2 °C) (Tympanic)   Resp 16   Ht 5' 8" (1 727 m)   Wt 82 8 kg (182 lb 9 6 oz)   SpO2 99%   BMI 27 76 kg/m²          Physical Exam   Constitutional: She is oriented to person, place, and time  She appears well-developed and well-nourished  HENT:   Head: Normocephalic and atraumatic  Right Ear: External ear normal    Left Ear: External ear normal    Mouth/Throat: Oropharynx is clear and moist    Nasal mucosa is mildly swollen, erythematous  Eyes: Pupils are equal, round, and reactive to light  Conjunctivae are normal    Neck: Normal range of motion  Neck supple  No thyromegaly present  Cardiovascular: Normal rate, regular rhythm and normal heart sounds  No murmur heard    No BL LE edema   Pulmonary/Chest: Effort normal and breath sounds normal    Abdominal: Soft  Bowel sounds are normal  There is no tenderness  Musculoskeletal: Normal range of motion  She exhibits no edema, tenderness or deformity  Lymphadenopathy:     She has no cervical adenopathy  Neurological: She is alert and oriented to person, place, and time  No cranial nerve deficit or sensory deficit  Coordination normal    Skin: Skin is warm and dry  No rash noted  Psychiatric: She has a normal mood and affect  Nursing note and vitals reviewed

## 2019-09-12 DIAGNOSIS — IMO0001 BIRTH CONTROL: ICD-10-CM

## 2019-09-12 NOTE — TELEPHONE ENCOUNTER
[facility]  Brain CT denied would need Peer to Peer      Contact [phone] per Shayan Green at [facility]    Tracking # [id number]

## 2019-09-13 ENCOUNTER — OFFICE VISIT (OUTPATIENT)
Dept: OBGYN CLINIC | Facility: CLINIC | Age: 34
End: 2019-09-13
Payer: COMMERCIAL

## 2019-09-13 VITALS
WEIGHT: 186 LBS | DIASTOLIC BLOOD PRESSURE: 68 MMHG | RESPIRATION RATE: 14 BRPM | BODY MASS INDEX: 28.28 KG/M2 | SYSTOLIC BLOOD PRESSURE: 110 MMHG

## 2019-09-13 DIAGNOSIS — IMO0001 BIRTH CONTROL: ICD-10-CM

## 2019-09-13 DIAGNOSIS — Z30.42 ENCOUNTER FOR DEPO-PROVERA CONTRACEPTION: Primary | ICD-10-CM

## 2019-09-13 PROCEDURE — 96372 THER/PROPH/DIAG INJ SC/IM: CPT | Performed by: PHYSICIAN ASSISTANT

## 2019-09-13 RX ORDER — MEDROXYPROGESTERONE ACETATE 150 MG/ML
150 INJECTION, SUSPENSION INTRAMUSCULAR
Status: DISCONTINUED | OUTPATIENT
Start: 2019-09-13 | End: 2021-08-27

## 2019-09-13 RX ORDER — MEDROXYPROGESTERONE ACETATE 150 MG/ML
INJECTION, SUSPENSION INTRAMUSCULAR
Qty: 1 SYRINGE | Refills: 3 | Status: SHIPPED | OUTPATIENT
Start: 2019-09-13 | End: 2020-08-21 | Stop reason: SDUPTHER

## 2019-09-13 RX ORDER — MEDROXYPROGESTERONE ACETATE 150 MG/ML
INJECTION, SUSPENSION INTRAMUSCULAR
Qty: 1 SYRINGE | Refills: 3 | OUTPATIENT
Start: 2019-09-13

## 2019-09-13 RX ADMIN — MEDROXYPROGESTERONE ACETATE 150 MG: 150 INJECTION, SUSPENSION INTRAMUSCULAR at 13:42

## 2019-09-13 NOTE — PROGRESS NOTES
Pt is here for Depo Provera injection  Her last dose was 06/21/2019  Her annual exam was on 09/06/2019  Urine pregnancy test done: NO    Depo given in Left  Buttock  Tolerated well   YES  Lot [id number] Exp 02/28/2023  Next dose due 11/29/2019

## 2019-09-13 NOTE — TELEPHONE ENCOUNTER
FYI- Do you want patient to proceed with the study? If so, you will need to do a [id number] review with insurance  Please advise?

## 2019-09-13 NOTE — TELEPHONE ENCOUNTER
Patient called stated she has an appt today however the pharmacy does not have a refill on her Depo

## 2019-09-13 NOTE — TELEPHONE ENCOUNTER
Please call patient to inform that CT of the head was denied  Recommended to start Claritin 10 mg or Zyrtec 10 mg daily for 1 week and call back if continues with headache,  then will try to do peer to peer review to get approval for CT head

## 2019-11-19 ENCOUNTER — OFFICE VISIT (OUTPATIENT)
Dept: FAMILY MEDICINE CLINIC | Facility: CLINIC | Age: 34
End: 2019-11-19
Payer: COMMERCIAL

## 2019-11-19 VITALS
SYSTOLIC BLOOD PRESSURE: 110 MMHG | TEMPERATURE: 98.3 F | BODY MASS INDEX: 28.07 KG/M2 | HEIGHT: 68 IN | HEART RATE: 70 BPM | WEIGHT: 185.2 LBS | RESPIRATION RATE: 14 BRPM | DIASTOLIC BLOOD PRESSURE: 74 MMHG | OXYGEN SATURATION: 99 %

## 2019-11-19 DIAGNOSIS — J06.9 ACUTE UPPER RESPIRATORY INFECTION: Primary | ICD-10-CM

## 2019-11-19 PROCEDURE — 99213 OFFICE O/P EST LOW 20 MIN: CPT | Performed by: FAMILY MEDICINE

## 2019-11-19 PROCEDURE — 1036F TOBACCO NON-USER: CPT | Performed by: FAMILY MEDICINE

## 2019-11-19 RX ORDER — LEVOCETIRIZINE DIHYDROCHLORIDE 5 MG/1
5 TABLET, FILM COATED ORAL EVERY EVENING
COMMUNITY

## 2019-11-19 RX ORDER — DEXTROMETHORPHAN HYDROBROMIDE AND PROMETHAZINE HYDROCHLORIDE 15; 6.25 MG/5ML; MG/5ML
5 SOLUTION ORAL 4 TIMES DAILY PRN
Qty: 240 ML | Refills: 0 | Status: SHIPPED | OUTPATIENT
Start: 2019-11-19 | End: 2020-02-28

## 2019-11-19 RX ORDER — AZITHROMYCIN 250 MG/1
TABLET, FILM COATED ORAL
Qty: 6 TABLET | Refills: 0 | Status: SHIPPED | OUTPATIENT
Start: 2019-11-19 | End: 2019-11-23

## 2019-11-19 NOTE — ASSESSMENT & PLAN NOTE
Recommended to increase fluid intake, use throat lozenges, gargle with warm salt water  Start Zithromax for 5 days, Promethazine DM cough syrup PRN for cough  Call office if symptoms persist or worsen

## 2019-11-19 NOTE — PROGRESS NOTES
Chief Complaint   Patient presents with    Sore Throat     Chest Congestion     Health Maintenance   Topic Date Due    Depression Screening PHQ  03/20/2020    BMI: Followup Plan  09/11/2020    BMI: Adult  09/13/2020    Cervical Cancer Screening  07/28/2022    DTaP,Tdap,and Td Vaccines (4 - Td) 10/28/2026    Pneumococcal Vaccine: 65+ Years (1 of 2 - PCV13) 08/01/2051    HIV Screening  Completed    Influenza Vaccine  Completed    Pneumococcal Vaccine: Pediatrics (0 to 5 Years) and At-Risk Patients (6 to 59 Years)  Aged Out    HIB Vaccine  Aged Out    Hepatitis B Vaccine  Aged Out    IPV Vaccine  Aged Out    Hepatitis A Vaccine  Aged Out    Meningococcal ACWY Vaccine  Aged Out    HPV Vaccine  Aged Out     Assessment/Plan:    Acute upper respiratory infection  Recommended to increase fluid intake, use throat lozenges, gargle with warm salt water  Start Zithromax for 5 days, Promethazine DM cough syrup PRN for cough  Call office if symptoms persist or worsen  Diagnoses and all orders for this visit:    Acute upper respiratory infection  -     azithromycin (ZITHROMAX) 250 mg tablet; Take 2 tablets 1 st day, then 1 tablet daily for 4 days, then stop  -     Promethazine-DM (PHENERGAN-DM) 6 25-15 mg/5 mL oral syrup; Take 5 mL by mouth 4 (four) times a day as needed for cough    Other orders  -     levocetirizine (XYZAL) 5 MG tablet; Take 5 mg by mouth every evening          Subjective:      Patient ID: Gregory Agee is a 36 y o  female  HPI     Patient presents today c/o sore throat, chest congestion, non- productive cough, raspy voice since Saturday, 11/16/19  Denies fever, chills  No chest tightness, wheezing  No prior history of asthma  Patient works at the medical office  Denies tobacco use      The following portions of the patient's history were reviewed and updated as appropriate: allergies, past family history, past medical history, past social history, past surgical history and problem list     Review of Systems   Constitutional: Negative for activity change, appetite change, chills, fatigue and fever  HENT: Positive for sore throat  Negative for congestion, ear pain, mouth sores, nosebleeds and sinus pressure  Eyes: Negative for pain, discharge, redness, itching and visual disturbance  Respiratory: Positive for cough  Negative for chest tightness, shortness of breath and wheezing  Chest congestion   Cardiovascular: Negative for chest pain, palpitations and leg swelling  Gastrointestinal: Negative for abdominal pain, diarrhea, nausea and vomiting  Genitourinary: Negative for dysuria and frequency  Musculoskeletal: Negative for arthralgias, back pain, joint swelling, myalgias and neck pain  Skin: Negative for rash and wound  Neurological: Negative for dizziness and headaches  Hematological: Negative  Objective:      /74 (BP Location: Left arm, Patient Position: Sitting, Cuff Size: Adult)   Pulse 70   Temp 98 3 °F (36 8 °C) (Tympanic)   Resp 14   Ht 5' 8" (1 727 m)   Wt 84 kg (185 lb 3 2 oz)   SpO2 99%   BMI 28 16 kg/m²          Physical Exam   Constitutional: She appears well-developed and well-nourished  HENT:   Head: Normocephalic and atraumatic  Right Ear: Hearing, tympanic membrane and ear canal normal    Left Ear: Hearing, tympanic membrane and ear canal normal    Mouth/Throat: Mucous membranes are normal  No oral lesions  Posterior oropharyngeal erythema present  Neck: Normal range of motion  Neck supple  Cardiovascular: Normal rate and normal heart sounds  No murmur heard  Pulmonary/Chest: Effort normal and breath sounds normal  She has no wheezes  She has no rales  Abdominal: Soft  Bowel sounds are normal  There is no tenderness  Lymphadenopathy:     She has no cervical adenopathy  Skin: Skin is warm and dry  No rash noted  Psychiatric: She has a normal mood and affect     Nursing note and vitals reviewed

## 2019-12-06 ENCOUNTER — CLINICAL SUPPORT (OUTPATIENT)
Dept: OBGYN CLINIC | Facility: CLINIC | Age: 34
End: 2019-12-06
Payer: COMMERCIAL

## 2019-12-06 VITALS — DIASTOLIC BLOOD PRESSURE: 78 MMHG | WEIGHT: 184 LBS | SYSTOLIC BLOOD PRESSURE: 108 MMHG | BODY MASS INDEX: 27.98 KG/M2

## 2019-12-06 DIAGNOSIS — Z30.42 ENCOUNTER FOR MANAGEMENT AND INJECTION OF DEPO-PROVERA: ICD-10-CM

## 2019-12-06 PROCEDURE — 96372 THER/PROPH/DIAG INJ SC/IM: CPT | Performed by: PHYSICIAN ASSISTANT

## 2019-12-06 RX ADMIN — MEDROXYPROGESTERONE ACETATE 150 MG: 150 INJECTION, SUSPENSION INTRAMUSCULAR at 13:27

## 2019-12-06 NOTE — PROGRESS NOTES
Pt is here for Depo Provera injection  Her last dose was 9/13/19  Her annual exam was on 9/6/19  Depo given in R Buttock  Tolerated well

## 2020-02-28 ENCOUNTER — CLINICAL SUPPORT (OUTPATIENT)
Dept: OBGYN CLINIC | Facility: CLINIC | Age: 35
End: 2020-02-28
Payer: COMMERCIAL

## 2020-02-28 VITALS — WEIGHT: 186.2 LBS | BODY MASS INDEX: 28.31 KG/M2 | SYSTOLIC BLOOD PRESSURE: 118 MMHG | DIASTOLIC BLOOD PRESSURE: 84 MMHG

## 2020-02-28 DIAGNOSIS — Z30.42 ENCOUNTER FOR DEPO-PROVERA CONTRACEPTION: ICD-10-CM

## 2020-02-28 PROCEDURE — 96372 THER/PROPH/DIAG INJ SC/IM: CPT | Performed by: PHYSICIAN ASSISTANT

## 2020-02-28 RX ADMIN — MEDROXYPROGESTERONE ACETATE 150 MG: 150 INJECTION, SUSPENSION INTRAMUSCULAR at 14:35

## 2020-02-28 NOTE — PROGRESS NOTES
Pt is here for Depo Provera injection  Her last dose was 12/6/19  Her annual exam was on 8/16/19  Urine pregnancy test done: N  Depo given in L buttock  Tolerated well  Lot: [id number]   Exp: 1/31/2024  Next dose due: May 16, 2020 - May 30, 2020

## 2020-05-22 ENCOUNTER — CLINICAL SUPPORT (OUTPATIENT)
Dept: OBGYN CLINIC | Facility: CLINIC | Age: 35
End: 2020-05-22
Payer: COMMERCIAL

## 2020-05-22 DIAGNOSIS — Z30.42 ENCOUNTER FOR MANAGEMENT AND INJECTION OF DEPO-PROVERA: ICD-10-CM

## 2020-05-22 PROCEDURE — 96372 THER/PROPH/DIAG INJ SC/IM: CPT | Performed by: PHYSICIAN ASSISTANT

## 2020-05-22 RX ADMIN — MEDROXYPROGESTERONE ACETATE 150 MG: 150 INJECTION, SUSPENSION INTRAMUSCULAR at 12:17

## 2020-08-21 ENCOUNTER — ANNUAL EXAM (OUTPATIENT)
Dept: OBGYN CLINIC | Facility: CLINIC | Age: 35
End: 2020-08-21
Payer: COMMERCIAL

## 2020-08-21 VITALS
WEIGHT: 188 LBS | DIASTOLIC BLOOD PRESSURE: 64 MMHG | BODY MASS INDEX: 27.85 KG/M2 | TEMPERATURE: 96.8 F | HEIGHT: 69 IN | SYSTOLIC BLOOD PRESSURE: 120 MMHG

## 2020-08-21 DIAGNOSIS — Z23 NEED FOR HPV VACCINATION: ICD-10-CM

## 2020-08-21 DIAGNOSIS — Z01.419 ENCNTR FOR GYN EXAM (GENERAL) (ROUTINE) W/O ABN FINDINGS: ICD-10-CM

## 2020-08-21 DIAGNOSIS — Z30.42 ENCOUNTER FOR SURVEILLANCE OF INJECTABLE CONTRACEPTIVE: ICD-10-CM

## 2020-08-21 PROBLEM — J06.9 ACUTE UPPER RESPIRATORY INFECTION: Status: RESOLVED | Noted: 2019-11-19 | Resolved: 2020-08-21

## 2020-08-21 PROCEDURE — 90651 9VHPV VACCINE 2/3 DOSE IM: CPT | Performed by: PHYSICIAN ASSISTANT

## 2020-08-21 PROCEDURE — 90471 IMMUNIZATION ADMIN: CPT | Performed by: PHYSICIAN ASSISTANT

## 2020-08-21 PROCEDURE — S0612 ANNUAL GYNECOLOGICAL EXAMINA: HCPCS | Performed by: PHYSICIAN ASSISTANT

## 2020-08-21 PROCEDURE — 96372 THER/PROPH/DIAG INJ SC/IM: CPT | Performed by: PHYSICIAN ASSISTANT

## 2020-08-21 RX ORDER — MEDROXYPROGESTERONE ACETATE 150 MG/ML
INJECTION, SUSPENSION INTRAMUSCULAR
Qty: 1 SYRINGE | Refills: 3 | Status: SHIPPED | OUTPATIENT
Start: 2020-08-21 | End: 2021-08-27 | Stop reason: SDUPTHER

## 2020-08-21 RX ORDER — MEDROXYPROGESTERONE ACETATE 150 MG/ML
INJECTION, SUSPENSION INTRAMUSCULAR
Qty: 1 SYRINGE | Refills: 0 | Status: SHIPPED | OUTPATIENT
Start: 2020-08-21 | End: 2020-08-21 | Stop reason: SDUPTHER

## 2020-08-21 RX ADMIN — MEDROXYPROGESTERONE ACETATE 150 MG: 150 INJECTION, SUSPENSION INTRAMUSCULAR at 14:01

## 2020-08-21 NOTE — PROGRESS NOTES
Adina Precise  1985      CC:  Yearly exam    S:  30 y o  female here for yearly exam      Sexual activity: She is sexually active without pain, bleeding or dryness  Contraception: She uses DepoProvera for contraception  She is amenorrheic on this  STD testing:  She does not want STD testing today  Gardasil:  She has not had the Gardasil series  She is interested in beginning this today  She works  at an orthodontist      Last Pap 7/28/17 neg/neg  Last Mammo never    We reviewed ASCCP guidelines for Pap testing today       Family hx of breast cancer: no  Family hx of ovarian cancer: no  Family hx of colon cancer: no      Current Outpatient Medications:     acetaminophen (TYLENOL) 325 mg tablet, Take as needed for pain , Disp: 30 tablet, Rfl: 0    calcium citrate-vitamin D (CITRACAL+D) 315-200 MG-UNIT per tablet, Take 1 tablet by mouth 2 (two) times a day, Disp: , Rfl:     cetirizine (ZyrTEC) 10 MG chewable tablet, Chew 1 tablet (10 mg total) daily, Disp: 30 tablet, Rfl: 0    levocetirizine (XYZAL) 5 MG tablet, Take 5 mg by mouth every evening, Disp: , Rfl:     medroxyPROGESTERone acetate (DEPO-PROVERA SYRINGE) 150 mg/mL injection, Inject every 12 weeks, Disp: 1 Syringe, Rfl: 0    Prenatal Vit-Fe Fumarate-FA ([employer] PRENATAL) 28-0 8 MG TABS, Take by mouth, Disp: , Rfl:     Current Facility-Administered Medications:     medroxyPROGESTERone (DEPO-PROVERA) IM injection 150 mg, 150 mg, Intramuscular, Q3 Months, Mendel Rao, MD, 150 mg at 05/22/20 1217    medroxyPROGESTERone acetate (DEPO-PROVERA SYRINGE) IM injection 150 mg, 150 mg, Intramuscular, Q3 Months, Ronda Kaye PA-C, 150 mg at 06/21/19 1425    medroxyPROGESTERone acetate (DEPO-PROVERA SYRINGE) IM injection 150 mg, 150 mg, Intramuscular, Q3 Months, Ronda Kaye PA-C, 150 mg at 02/28/20 1435  Social History     Socioeconomic History    Marital status: /Civil Union     Spouse name: Not on file    Number of children: Not on file    Years of education: Not on file    Highest education level: Not on file   Occupational History    Not on file   Social Needs    Financial resource strain: Not on file    Food insecurity     Worry: Not on file     Inability: Not on file    Transportation needs     Medical: Not on file     Non-medical: Not on file   Tobacco Use    Smoking status: Never Smoker    Smokeless tobacco: Never Used   Substance and Sexual Activity    Alcohol use: Yes     Comment: socially/rare    Drug use: No    Sexual activity: Yes     Partners: Male     Birth control/protection: Injection, Coitus interruptus     Comment: depo injection    Lifestyle    Physical activity     Days per week: Not on file     Minutes per session: Not on file    Stress: Not on file   Relationships    Social connections     Talks on phone: Not on file     Gets together: Not on file     Attends [employer] service: Not on file     Active member of club or organization: Not on file     Attends meetings of clubs or organizations: Not on file     Relationship status: Not on file    Intimate partner violence     Fear of current or ex partner: Not on file     Emotionally abused: Not on file     Physically abused: Not on file     Forced sexual activity: Not on file   Other Topics Concern    Not on file   Social History Narrative    Almost always uses seatbelt     Family History   Problem Relation Age of Onset    Miscarriages / [place] Mother     Hyperlipidemia Father     Cancer Maternal Grandmother     COPD Paternal Grandfather       Past Medical History:   Diagnosis Date    Allergic rhinitis     Last assessed 06/02/12    Asthma     Last assessed 11/24/17    Cervical polyp     Last assessed 02/21/17    Disease of thyroid gland     nodule    Postcoital bleeding     Varicella     childhood        Review of Systems   Respiratory: Negative  Cardiovascular: Negative      Gastrointestinal: Negative for constipation and diarrhea  Genitourinary: Negative for difficulty urinating, pelvic pain, vaginal bleeding, vaginal discharge, itching or odor  O:  Blood pressure 120/64, temperature (!) 96 8 °F (36 °C), temperature source Tympanic, height 5' 8 5" (1 74 m), weight 85 3 kg (188 lb), not currently breastfeeding  Patient appears well and is not in distress  Neck is supple without masses  Breasts are symmetrical without mass, tenderness, nipple discharge, skin changes or adenopathy  Abdomen is soft and nontender without masses  External genitals are normal without lesions or rashes  Urethral meatus and urethra are normal  Bladder is normal to palpation  Vagina is normal without discharge or bleeding  Cervix is normal without discharge or lesion  Uterus is normal, mobile, nontender without palpable mass  Adnexa are normal, nontender, without palpable mass  A:  Yearly exam      P:   Pap 2022     Depo sent to pharmacy    Gardasil #1 today, RTO 2 months for #2    RTO one year for yearly exam or sooner as needed

## 2020-08-21 NOTE — PROGRESS NOTES
Pt is here for Depo Provera injection  Her last dose was 5/22/2020  Her annual exam was on 8/21/2020  Urine pregnancy test done: N/A   Result: N/A  Depo given in L  Buttock  Tolerated well    Next dose due 3 months

## 2020-10-19 ENCOUNTER — TELEMEDICINE (OUTPATIENT)
Dept: FAMILY MEDICINE CLINIC | Facility: CLINIC | Age: 35
End: 2020-10-19
Payer: COMMERCIAL

## 2020-10-19 DIAGNOSIS — Z20.822 ENCOUNTER FOR SCREENING LABORATORY TESTING FOR COVID-19 VIRUS: Primary | ICD-10-CM

## 2020-10-19 PROBLEM — Z11.52 ENCOUNTER FOR SCREENING LABORATORY TESTING FOR COVID-19 VIRUS: Status: ACTIVE | Noted: 2020-10-19

## 2020-10-19 PROCEDURE — 99212 OFFICE O/P EST SF 10 MIN: CPT | Performed by: FAMILY MEDICINE

## 2020-10-19 PROCEDURE — 1036F TOBACCO NON-USER: CPT | Performed by: FAMILY MEDICINE

## 2020-10-21 DIAGNOSIS — Z20.822 ENCOUNTER FOR SCREENING LABORATORY TESTING FOR COVID-19 VIRUS: ICD-10-CM

## 2020-10-21 PROCEDURE — U0003 INFECTIOUS AGENT DETECTION BY NUCLEIC ACID (DNA OR RNA); SEVERE ACUTE RESPIRATORY SYNDROME CORONAVIRUS 2 (SARS-COV-2) (CORONAVIRUS DISEASE [COVID-19]), AMPLIFIED PROBE TECHNIQUE, MAKING USE OF HIGH THROUGHPUT TECHNOLOGIES AS DESCRIBED BY CMS-2020-01-R: HCPCS | Performed by: FAMILY MEDICINE

## 2020-10-22 LAB — SARS-COV-2 RNA SPEC QL NAA+PROBE: NOT DETECTED

## 2020-10-30 ENCOUNTER — CLINICAL SUPPORT (OUTPATIENT)
Dept: OBGYN CLINIC | Facility: CLINIC | Age: 35
End: 2020-10-30
Payer: COMMERCIAL

## 2020-10-30 VITALS
DIASTOLIC BLOOD PRESSURE: 82 MMHG | WEIGHT: 196 LBS | BODY MASS INDEX: 29.36 KG/M2 | TEMPERATURE: 99.2 F | SYSTOLIC BLOOD PRESSURE: 134 MMHG

## 2020-10-30 DIAGNOSIS — Z23 NEED FOR HPV VACCINATION: Primary | ICD-10-CM

## 2020-10-30 PROCEDURE — 90651 9VHPV VACCINE 2/3 DOSE IM: CPT

## 2020-10-30 PROCEDURE — 90471 IMMUNIZATION ADMIN: CPT

## 2020-11-20 ENCOUNTER — CLINICAL SUPPORT (OUTPATIENT)
Dept: OBGYN CLINIC | Facility: CLINIC | Age: 35
End: 2020-11-20
Payer: COMMERCIAL

## 2020-11-20 VITALS — DIASTOLIC BLOOD PRESSURE: 84 MMHG | SYSTOLIC BLOOD PRESSURE: 114 MMHG | WEIGHT: 196.4 LBS | BODY MASS INDEX: 29.42 KG/M2

## 2020-11-20 DIAGNOSIS — Z30.42 ENCOUNTER FOR SURVEILLANCE OF INJECTABLE CONTRACEPTIVE: Primary | ICD-10-CM

## 2020-11-20 PROCEDURE — 96372 THER/PROPH/DIAG INJ SC/IM: CPT | Performed by: PHYSICIAN ASSISTANT

## 2020-11-20 RX ORDER — DIPHENOXYLATE HYDROCHLORIDE AND ATROPINE SULFATE 2.5; .025 MG/1; MG/1
1 TABLET ORAL DAILY
COMMUNITY

## 2020-11-20 RX ADMIN — MEDROXYPROGESTERONE ACETATE 150 MG: 150 INJECTION, SUSPENSION INTRAMUSCULAR at 13:20

## 2020-12-12 ENCOUNTER — NURSE TRIAGE (OUTPATIENT)
Dept: OTHER | Facility: OTHER | Age: 35
End: 2020-12-12

## 2020-12-12 DIAGNOSIS — R50.9 FEVER, UNSPECIFIED FEVER CAUSE: ICD-10-CM

## 2020-12-12 DIAGNOSIS — R50.9 FEVER, UNSPECIFIED FEVER CAUSE: Primary | ICD-10-CM

## 2020-12-12 PROCEDURE — U0003 INFECTIOUS AGENT DETECTION BY NUCLEIC ACID (DNA OR RNA); SEVERE ACUTE RESPIRATORY SYNDROME CORONAVIRUS 2 (SARS-COV-2) (CORONAVIRUS DISEASE [COVID-19]), AMPLIFIED PROBE TECHNIQUE, MAKING USE OF HIGH THROUGHPUT TECHNOLOGIES AS DESCRIBED BY CMS-2020-01-R: HCPCS | Performed by: FAMILY MEDICINE

## 2020-12-13 LAB — SARS-COV-2 RNA SPEC QL NAA+PROBE: DETECTED

## 2020-12-14 ENCOUNTER — TELEMEDICINE (OUTPATIENT)
Dept: FAMILY MEDICINE CLINIC | Facility: CLINIC | Age: 35
End: 2020-12-14
Payer: COMMERCIAL

## 2020-12-14 ENCOUNTER — APPOINTMENT (OUTPATIENT)
Dept: RADIOLOGY | Age: 35
End: 2020-12-14
Payer: COMMERCIAL

## 2020-12-14 ENCOUNTER — OFFICE VISIT (OUTPATIENT)
Dept: URGENT CARE | Age: 35
End: 2020-12-14
Payer: COMMERCIAL

## 2020-12-14 VITALS
HEIGHT: 68 IN | RESPIRATION RATE: 20 BRPM | TEMPERATURE: 97 F | BODY MASS INDEX: 29.55 KG/M2 | WEIGHT: 195 LBS | OXYGEN SATURATION: 98 % | HEART RATE: 112 BPM

## 2020-12-14 VITALS — TEMPERATURE: 100.8 F

## 2020-12-14 DIAGNOSIS — J18.9 PNEUMONIA DUE TO INFECTIOUS ORGANISM, UNSPECIFIED LATERALITY, UNSPECIFIED PART OF LUNG: Primary | ICD-10-CM

## 2020-12-14 DIAGNOSIS — U07.1 COVID-19 VIRUS INFECTION: Primary | ICD-10-CM

## 2020-12-14 DIAGNOSIS — U07.1 COVID-19: Primary | ICD-10-CM

## 2020-12-14 DIAGNOSIS — U07.1 COVID-19: ICD-10-CM

## 2020-12-14 PROCEDURE — 99213 OFFICE O/P EST LOW 20 MIN: CPT | Performed by: FAMILY MEDICINE

## 2020-12-14 PROCEDURE — G0382 LEV 3 HOSP TYPE B ED VISIT: HCPCS | Performed by: PHYSICIAN ASSISTANT

## 2020-12-14 PROCEDURE — S9083 URGENT CARE CENTER GLOBAL: HCPCS | Performed by: PHYSICIAN ASSISTANT

## 2020-12-14 PROCEDURE — 71046 X-RAY EXAM CHEST 2 VIEWS: CPT

## 2020-12-14 RX ORDER — DEXTROMETHORPHAN HYDROBROMIDE AND PROMETHAZINE HYDROCHLORIDE 15; 6.25 MG/5ML; MG/5ML
SOLUTION ORAL
Qty: 240 ML | Refills: 0 | Status: SHIPPED | OUTPATIENT
Start: 2020-12-14 | End: 2021-05-14 | Stop reason: ALTCHOICE

## 2020-12-14 RX ORDER — DOXYCYCLINE 100 MG/1
100 TABLET ORAL 2 TIMES DAILY
Qty: 14 TABLET | Refills: 0 | Status: SHIPPED | OUTPATIENT
Start: 2020-12-14 | End: 2020-12-21

## 2020-12-16 ENCOUNTER — TELEPHONE (OUTPATIENT)
Dept: FAMILY MEDICINE CLINIC | Facility: CLINIC | Age: 35
End: 2020-12-16

## 2020-12-16 ENCOUNTER — TELEMEDICINE (OUTPATIENT)
Dept: FAMILY MEDICINE CLINIC | Facility: CLINIC | Age: 35
End: 2020-12-16
Payer: COMMERCIAL

## 2020-12-16 DIAGNOSIS — J12.82 PNEUMONIA DUE TO COVID-19 VIRUS: ICD-10-CM

## 2020-12-16 DIAGNOSIS — U07.1 COVID-19 VIRUS INFECTION: Primary | ICD-10-CM

## 2020-12-16 DIAGNOSIS — U07.1 PNEUMONIA DUE TO COVID-19 VIRUS: ICD-10-CM

## 2020-12-16 PROCEDURE — 99213 OFFICE O/P EST LOW 20 MIN: CPT | Performed by: FAMILY MEDICINE

## 2020-12-16 RX ORDER — ALBUTEROL SULFATE 90 UG/1
AEROSOL, METERED RESPIRATORY (INHALATION)
Qty: 1 INHALER | Refills: 1 | Status: SHIPPED | OUTPATIENT
Start: 2020-12-16 | End: 2021-08-27 | Stop reason: ALTCHOICE

## 2020-12-18 ENCOUNTER — TELEMEDICINE (OUTPATIENT)
Dept: FAMILY MEDICINE CLINIC | Facility: CLINIC | Age: 35
End: 2020-12-18
Payer: COMMERCIAL

## 2020-12-18 DIAGNOSIS — U07.1 COVID-19 VIRUS INFECTION: Primary | ICD-10-CM

## 2020-12-18 DIAGNOSIS — J12.82 PNEUMONIA DUE TO COVID-19 VIRUS: ICD-10-CM

## 2020-12-18 DIAGNOSIS — U07.1 PNEUMONIA DUE TO COVID-19 VIRUS: ICD-10-CM

## 2020-12-18 PROCEDURE — 1036F TOBACCO NON-USER: CPT | Performed by: FAMILY MEDICINE

## 2020-12-18 PROCEDURE — 99213 OFFICE O/P EST LOW 20 MIN: CPT | Performed by: FAMILY MEDICINE

## 2020-12-21 ENCOUNTER — TELEMEDICINE (OUTPATIENT)
Dept: FAMILY MEDICINE CLINIC | Facility: CLINIC | Age: 35
End: 2020-12-21
Payer: COMMERCIAL

## 2020-12-21 DIAGNOSIS — U07.1 PNEUMONIA DUE TO COVID-19 VIRUS: ICD-10-CM

## 2020-12-21 DIAGNOSIS — J12.82 PNEUMONIA DUE TO COVID-19 VIRUS: ICD-10-CM

## 2020-12-21 DIAGNOSIS — U07.1 COVID-19 VIRUS INFECTION: Primary | ICD-10-CM

## 2020-12-21 PROCEDURE — 99213 OFFICE O/P EST LOW 20 MIN: CPT | Performed by: FAMILY MEDICINE

## 2020-12-24 ENCOUNTER — TELEMEDICINE (OUTPATIENT)
Dept: FAMILY MEDICINE CLINIC | Facility: CLINIC | Age: 35
End: 2020-12-24
Payer: COMMERCIAL

## 2020-12-24 DIAGNOSIS — J12.82 PNEUMONIA DUE TO COVID-19 VIRUS: ICD-10-CM

## 2020-12-24 DIAGNOSIS — U07.1 PNEUMONIA DUE TO COVID-19 VIRUS: ICD-10-CM

## 2020-12-24 DIAGNOSIS — U07.1 COVID-19 VIRUS INFECTION: Primary | ICD-10-CM

## 2020-12-24 PROCEDURE — 1036F TOBACCO NON-USER: CPT | Performed by: FAMILY MEDICINE

## 2020-12-24 PROCEDURE — 3725F SCREEN DEPRESSION PERFORMED: CPT | Performed by: FAMILY MEDICINE

## 2020-12-24 PROCEDURE — 99213 OFFICE O/P EST LOW 20 MIN: CPT | Performed by: FAMILY MEDICINE

## 2020-12-28 ENCOUNTER — TELEPHONE (OUTPATIENT)
Dept: FAMILY MEDICINE CLINIC | Facility: CLINIC | Age: 35
End: 2020-12-28

## 2020-12-28 DIAGNOSIS — Z20.822 ENCOUNTER FOR SCREENING LABORATORY TESTING FOR COVID-19 VIRUS: Primary | ICD-10-CM

## 2020-12-28 NOTE — TELEPHONE ENCOUNTER
Please call patient  Order placed in chart    I explained to her at the last visit that she may get a positive COVID-19 test up to 3 months after recovery from COVID-19 and insurance may not cover the test

## 2020-12-28 NOTE — TELEPHONE ENCOUNTER
Had positive Covid test   Needs to be retested and have negative result before returning to work  Let patient know when ordered placed

## 2020-12-29 DIAGNOSIS — Z20.822 ENCOUNTER FOR SCREENING LABORATORY TESTING FOR COVID-19 VIRUS: ICD-10-CM

## 2020-12-29 PROCEDURE — U0003 INFECTIOUS AGENT DETECTION BY NUCLEIC ACID (DNA OR RNA); SEVERE ACUTE RESPIRATORY SYNDROME CORONAVIRUS 2 (SARS-COV-2) (CORONAVIRUS DISEASE [COVID-19]), AMPLIFIED PROBE TECHNIQUE, MAKING USE OF HIGH THROUGHPUT TECHNOLOGIES AS DESCRIBED BY CMS-2020-01-R: HCPCS | Performed by: FAMILY MEDICINE

## 2020-12-30 LAB — SARS-COV-2 RNA SPEC QL NAA+PROBE: NOT DETECTED

## 2021-01-18 ENCOUNTER — TELEPHONE (OUTPATIENT)
Dept: FAMILY MEDICINE CLINIC | Facility: CLINIC | Age: 36
End: 2021-01-18

## 2021-01-18 ENCOUNTER — TELEMEDICINE (OUTPATIENT)
Dept: FAMILY MEDICINE CLINIC | Facility: CLINIC | Age: 36
End: 2021-01-18
Payer: COMMERCIAL

## 2021-01-18 DIAGNOSIS — R51.9 ACUTE NONINTRACTABLE HEADACHE, UNSPECIFIED HEADACHE TYPE: Primary | ICD-10-CM

## 2021-01-18 PROCEDURE — 99213 OFFICE O/P EST LOW 20 MIN: CPT | Performed by: NURSE PRACTITIONER

## 2021-01-18 PROCEDURE — 1036F TOBACCO NON-USER: CPT | Performed by: NURSE PRACTITIONER

## 2021-01-18 NOTE — TELEPHONE ENCOUNTER
Please schedule virtual visit with one of the providers avalable this week  Recommend to stay well hydrated, Take Tylenol PRN

## 2021-01-18 NOTE — PROGRESS NOTES
Virtual Regular Visit      Assessment/Plan:    Problem List Items Addressed This Visit     None      Visit Diagnoses     Acute nonintractable headache, unspecified headache type    -  Primary        Acute headache- likely a lingering side effect due to her recent COVID 19 infection  She tested positive for COVID 19 on 12/12/2020  No red flags on exam today  Recommended to stay well hydrated, increase rest   Declined anything for nausea today  She is having symptom improvement with OTC Ibuprofen (600 mg 2-3 times per day) and she may continue this or Tylenol (max of 3g/24 hours) prn  Take Ibuprofen with food  Caution with overuse of NSAIDs and rebound headaches  Call our office if symptoms worsen or persist        Reason for visit is No chief complaint on file  Encounter provider OMARI Locke    Provider located at [address] [place] [id number]      Recent Visits  No visits were found meeting these conditions  Showing recent visits within past 7 days and meeting all other requirements     Today's Visits  Date Type Provider Dept   01/18/21 [address], [facility]   01/18/21 Telephone 60[facility]   Showing today's visits and meeting all other requirements     Future Appointments  No visits were found meeting these conditions  Showing future appointments within next 150 days and meeting all other requirements        The patient was identified by name and date of birth  Dona Rhodes was informed that this is a telemedicine visit and that the visit is being conducted through [address] [place] [address] and patient was informed that this is not a secure, HIPAA-compliant platform  She agrees to proceed     My office door was closed  No one else was in the room  She acknowledged consent and understanding of privacy and security of the video platform   The patient has agreed to participate and understands they can discontinue the visit at any time  Patient is aware this is a billable service  Subjective  Jeanie Esquivel is a 30 y o  female       HPI     Pt presents virtually today for an acute visit    She was diagnosed with COVID 19 on 12/12/2020  She reports she has been doing relatively well overall in regards to her symptoms  She has returned to work full time  Over the past week, she has developed dull headaches on almost a daily basis  Headaches tend to be frontal and dull in nature  She had one episode of associated nausea, no vomiting  Denies any visual changes, tinnitus, dizziness, numbness, weakness  Denies fevers, chills, coughing, SOB, palpitations  OTC Ibuprofen 600 mg 2-3 times per day does help alleviate symptoms  Headaches do not wake her up at night  She does not wake up with a headache in the morning- seems to present as the day persists  Denies sinus pressure or congestion  She did just restart her Zyrtec but this did not provide any improvement in her headaches     Non smoker  Social ETOH use    No recent medication or dietary changes   She is well hydrated     Past Medical History:   Diagnosis Date    Allergic rhinitis     Last assessed 06/02/12    Asthma     Last assessed 11/24/17    Cervical polyp     Last assessed 02/21/17    Disease of thyroid gland     nodule    Postcoital bleeding     Varicella     childhood       Past Surgical History:   Procedure Laterality Date    COLPOSCOPY      Polyp on cervix    LASIK      WISDOM TOOTH EXTRACTION         Current Outpatient Medications   Medication Sig Dispense Refill    acetaminophen (TYLENOL) 325 mg tablet Take as needed for pain   30 tablet 0    albuterol (Ventolin HFA) 90 mcg/act inhaler Use 2 puffs every 4-6 hr  PRN for chest tightness 1 Inhaler 1    calcium citrate-vitamin D (CITRACAL+D) 315-200 MG-UNIT per tablet Take 1 tablet by mouth 2 (two) times a day      cetirizine (ZyrTEC) 10 MG chewable tablet Chew 1 tablet (10 mg total) daily 30 tablet 0    levocetirizine (XYZAL) 5 MG tablet Take 5 mg by mouth every evening      medroxyPROGESTERone acetate (DEPO-PROVERA SYRINGE) 150 mg/mL injection Inject every 12 weeks 1 Syringe 3    multivitamin (THERAGRAN) TABS Take 1 tablet by mouth daily      Promethazine-DM (PHENERGAN-DM) 6 25-15 mg/5 mL oral syrup Take 1 tsp  every 6 hr  PRN for cough 240 mL 0     Current Facility-Administered Medications   Medication Dose Route Frequency Provider Last Rate Last Admin    medroxyPROGESTERone (DEPO-PROVERA) IM injection 150 mg  150 mg Intramuscular Q3 Months Vincenzo Stephens MD   150 mg at 08/21/20 1401    medroxyPROGESTERone acetate (DEPO-PROVERA SYRINGE) IM injection 150 mg  150 mg Intramuscular Q3 Months Reid Burgos PA-C   150 mg at 06/21/19 1425    medroxyPROGESTERone acetate (DEPO-PROVERA SYRINGE) IM injection 150 mg  150 mg Intramuscular Q3 Months Ronda Kaye PA-C   150 mg at 11/20/20 1320        Allergies   Allergen Reactions    Pollen Extract Sneezing and Nasal Congestion       Review of Systems   Constitutional: Negative for chills, fatigue, fever and unexpected weight change  HENT: Negative for congestion, ear pain, hearing loss, postnasal drip, rhinorrhea, sinus pressure and sore throat  Respiratory: Negative for cough, shortness of breath and wheezing  Cardiovascular: Negative for chest pain, palpitations and leg swelling  Gastrointestinal: Negative for abdominal pain, blood in stool, constipation, diarrhea, nausea and vomiting  Musculoskeletal: Negative for arthralgias and myalgias  Skin: Negative for rash and wound  Neurological: Positive for headaches  Negative for dizziness, weakness and numbness  Psychiatric/Behavioral: Negative for sleep disturbance and suicidal ideas  Video Exam    There were no vitals filed for this visit  Physical Exam  Constitutional:       General: She is not in acute distress  Appearance: She is well-developed  She is not diaphoretic  HENT:      Head: Normocephalic and atraumatic  Neck:      Musculoskeletal: Normal range of motion and neck supple  Pulmonary:      Effort: Pulmonary effort is normal    Musculoskeletal: Normal range of motion  Skin:     Coloration: Skin is not pale  Findings: No rash  Neurological:      Mental Status: She is alert and oriented to person, place, and time  Gait: Gait normal    Psychiatric:         Mood and Affect: Mood normal          Behavior: Behavior normal          Thought Content: Thought content normal          Judgment: Judgment normal           I spent 15 minutes directly with the patient during this visit      VIRTUAL VISIT [address] acknowledges that she has consented to an online visit or consultation  She understands that the online visit is based solely on information provided by her, and that, in the absence of a face-to-face physical evaluation by the physician, the diagnosis she receives is both limited and provisional in terms of accuracy and completeness  This is not intended to replace a full medical face-to-face evaluation by the physician  Roscoe Fontanez understands and accepts these terms

## 2021-01-18 NOTE — TELEPHONE ENCOUNTER
Patient has been covid symptom free for 4 weeks  The last week she has been having headaches, sometimes dull and sometimes she has nausea with it  Patient would like a call at [phone] as to how she should proceed

## 2021-01-22 ENCOUNTER — APPOINTMENT (OUTPATIENT)
Dept: RADIOLOGY | Age: 36
End: 2021-01-22
Payer: COMMERCIAL

## 2021-01-22 DIAGNOSIS — U07.1 PNEUMONIA DUE TO COVID-19 VIRUS: ICD-10-CM

## 2021-01-22 DIAGNOSIS — J12.82 PNEUMONIA DUE TO COVID-19 VIRUS: ICD-10-CM

## 2021-01-22 PROCEDURE — 71046 X-RAY EXAM CHEST 2 VIEWS: CPT

## 2021-02-09 DIAGNOSIS — Z23 ENCOUNTER FOR IMMUNIZATION: ICD-10-CM

## 2021-02-19 ENCOUNTER — CLINICAL SUPPORT (OUTPATIENT)
Dept: OBGYN CLINIC | Facility: CLINIC | Age: 36
End: 2021-02-19
Payer: COMMERCIAL

## 2021-02-19 VITALS — BODY MASS INDEX: 29.95 KG/M2 | SYSTOLIC BLOOD PRESSURE: 126 MMHG | WEIGHT: 197 LBS | DIASTOLIC BLOOD PRESSURE: 86 MMHG

## 2021-02-19 DIAGNOSIS — Z30.42 ENCOUNTER FOR MANAGEMENT AND INJECTION OF DEPO-PROVERA: Primary | ICD-10-CM

## 2021-02-19 PROCEDURE — 96372 THER/PROPH/DIAG INJ SC/IM: CPT | Performed by: OBSTETRICS & GYNECOLOGY

## 2021-02-19 RX ORDER — MEDROXYPROGESTERONE ACETATE 150 MG/ML
150 INJECTION, SUSPENSION INTRAMUSCULAR ONCE
Status: COMPLETED | OUTPATIENT
Start: 2021-02-19 | End: 2021-02-19

## 2021-02-19 RX ADMIN — MEDROXYPROGESTERONE ACETATE 150 MG: 150 INJECTION, SUSPENSION INTRAMUSCULAR at 14:50

## 2021-02-19 NOTE — PROGRESS NOTES
Pt is here today for her depo provera injection  Pt last depo was 11/20/2020  Pt is on time and therefore no UPT required  Pt last anuual was 08/21/20 with provider [facility]  Performed RVS with patient  Josefa Bull 47 # [id number]  Lot # [id number]  Exp date- 12/31/2024  - Rajiv Alexandre  Prefilled syringe  Location - RUOQ buttock    Patient tolerated well  Pt had no concerns with depo and stated lmp was prior to lat pregnancy 4 years ago  Pt informed to schedule next depo for 05/07/21-05/21/2021  Pt agreed

## 2021-03-05 ENCOUNTER — CLINICAL SUPPORT (OUTPATIENT)
Dept: OBGYN CLINIC | Facility: CLINIC | Age: 36
End: 2021-03-05
Payer: COMMERCIAL

## 2021-03-05 VITALS — BODY MASS INDEX: 29.41 KG/M2 | SYSTOLIC BLOOD PRESSURE: 150 MMHG | WEIGHT: 193.4 LBS | DIASTOLIC BLOOD PRESSURE: 90 MMHG

## 2021-03-05 DIAGNOSIS — Z23 NEED FOR HPV VACCINATION: Primary | ICD-10-CM

## 2021-03-05 PROCEDURE — 90471 IMMUNIZATION ADMIN: CPT | Performed by: PHYSICIAN ASSISTANT

## 2021-03-05 PROCEDURE — 90651 9VHPV VACCINE 2/3 DOSE IM: CPT | Performed by: PHYSICIAN ASSISTANT

## 2021-03-15 ENCOUNTER — VBI (OUTPATIENT)
Dept: ADMINISTRATIVE | Facility: OTHER | Age: 36
End: 2021-03-15

## 2021-03-16 ENCOUNTER — IMMUNIZATIONS (OUTPATIENT)
Dept: FAMILY MEDICINE CLINIC | Facility: HOSPITAL | Age: 36
End: 2021-03-16

## 2021-03-16 DIAGNOSIS — Z23 ENCOUNTER FOR IMMUNIZATION: Primary | ICD-10-CM

## 2021-03-16 PROCEDURE — 91300 SARS-COV-2 / COVID-19 MRNA VACCINE (PFIZER-BIONTECH) 30 MCG: CPT

## 2021-03-16 PROCEDURE — 0001A SARS-COV-2 / COVID-19 MRNA VACCINE (PFIZER-BIONTECH) 30 MCG: CPT

## 2021-04-08 ENCOUNTER — IMMUNIZATIONS (OUTPATIENT)
Dept: FAMILY MEDICINE CLINIC | Facility: HOSPITAL | Age: 36
End: 2021-04-08

## 2021-04-08 DIAGNOSIS — Z23 ENCOUNTER FOR IMMUNIZATION: Primary | ICD-10-CM

## 2021-04-08 PROCEDURE — 0002A SARS-COV-2 / COVID-19 MRNA VACCINE (PFIZER-BIONTECH) 30 MCG: CPT

## 2021-04-08 PROCEDURE — 91300 SARS-COV-2 / COVID-19 MRNA VACCINE (PFIZER-BIONTECH) 30 MCG: CPT

## 2021-05-07 ENCOUNTER — TELEPHONE (OUTPATIENT)
Dept: FAMILY MEDICINE CLINIC | Facility: CLINIC | Age: 36
End: 2021-05-07

## 2021-05-07 NOTE — TELEPHONE ENCOUNTER
Please call patient  Rapid strep test can be false negative  If continues with sore throat, difficulty swallowing may need to check throat culture to rule out strep  Recommended to gargle with warm salt water, use throat lozenges

## 2021-05-07 NOTE — TELEPHONE ENCOUNTER
Patient had a sore throat and white spots on back of throat Tuesday and went to Urgent Care Wednesday and had a negative strep test   Patient still has the white spots but no soreness in her throat and wants to know if it's possible to get a false negative on a strep test if done too soon  Please call patient at [phone]

## 2021-05-14 ENCOUNTER — CLINICAL SUPPORT (OUTPATIENT)
Dept: OBGYN CLINIC | Facility: CLINIC | Age: 36
End: 2021-05-14
Payer: COMMERCIAL

## 2021-05-14 VITALS — SYSTOLIC BLOOD PRESSURE: 124 MMHG | BODY MASS INDEX: 29.1 KG/M2 | DIASTOLIC BLOOD PRESSURE: 78 MMHG | WEIGHT: 191.4 LBS

## 2021-05-14 DIAGNOSIS — Z30.42 DEPO-PROVERA CONTRACEPTIVE STATUS: Primary | ICD-10-CM

## 2021-05-14 PROCEDURE — 96372 THER/PROPH/DIAG INJ SC/IM: CPT | Performed by: NURSE PRACTITIONER

## 2021-05-14 RX ADMIN — MEDROXYPROGESTERONE ACETATE 150 MG: 150 INJECTION, SUSPENSION INTRAMUSCULAR at 11:05

## 2021-05-14 NOTE — PROGRESS NOTES
Pt is here for Depo Provera injection  Her last dose was 2/19/2021  Her annual exam was on 8/21/2021  Urine pregnancy test done: n/a   Result: n/a  Depo given in /R Buttock  Tolerated well    Next dose due 3 months

## 2021-07-30 ENCOUNTER — CLINICAL SUPPORT (OUTPATIENT)
Dept: OBGYN CLINIC | Facility: CLINIC | Age: 36
End: 2021-07-30
Payer: COMMERCIAL

## 2021-07-30 VITALS — SYSTOLIC BLOOD PRESSURE: 112 MMHG | DIASTOLIC BLOOD PRESSURE: 78 MMHG | BODY MASS INDEX: 29.24 KG/M2 | WEIGHT: 192.3 LBS

## 2021-07-30 DIAGNOSIS — Z30.42 ENCOUNTER FOR MANAGEMENT AND INJECTION OF DEPO-PROVERA: Primary | ICD-10-CM

## 2021-07-30 PROCEDURE — 96372 THER/PROPH/DIAG INJ SC/IM: CPT | Performed by: PHYSICIAN ASSISTANT

## 2021-07-30 RX ADMIN — MEDROXYPROGESTERONE ACETATE 150 MG: 150 INJECTION, SUSPENSION INTRAMUSCULAR at 12:14

## 2021-08-27 ENCOUNTER — ANNUAL EXAM (OUTPATIENT)
Dept: OBGYN CLINIC | Facility: CLINIC | Age: 36
End: 2021-08-27
Payer: COMMERCIAL

## 2021-08-27 VITALS
WEIGHT: 193 LBS | DIASTOLIC BLOOD PRESSURE: 78 MMHG | HEIGHT: 68 IN | BODY MASS INDEX: 29.25 KG/M2 | SYSTOLIC BLOOD PRESSURE: 130 MMHG

## 2021-08-27 DIAGNOSIS — Z30.42 ENCOUNTER FOR SURVEILLANCE OF INJECTABLE CONTRACEPTIVE: ICD-10-CM

## 2021-08-27 DIAGNOSIS — Z01.419 ENCNTR FOR GYN EXAM (GENERAL) (ROUTINE) W/O ABN FINDINGS: ICD-10-CM

## 2021-08-27 PROBLEM — R51.9 DAILY HEADACHE: Status: RESOLVED | Noted: 2019-09-11 | Resolved: 2021-08-27

## 2021-08-27 PROBLEM — U07.1 PNEUMONIA DUE TO COVID-19 VIRUS: Status: RESOLVED | Noted: 2020-12-16 | Resolved: 2021-08-27

## 2021-08-27 PROBLEM — J12.82 PNEUMONIA DUE TO COVID-19 VIRUS: Status: RESOLVED | Noted: 2020-12-16 | Resolved: 2021-08-27

## 2021-08-27 PROBLEM — R53.82 CHRONIC FATIGUE: Status: RESOLVED | Noted: 2019-03-20 | Resolved: 2021-08-27

## 2021-08-27 PROBLEM — D64.9 LOW HEMOGLOBIN: Status: RESOLVED | Noted: 2019-03-20 | Resolved: 2021-08-27

## 2021-08-27 PROBLEM — U07.1 COVID-19 VIRUS INFECTION: Status: RESOLVED | Noted: 2020-12-14 | Resolved: 2021-08-27

## 2021-08-27 PROBLEM — Z20.822 ENCOUNTER FOR SCREENING LABORATORY TESTING FOR COVID-19 VIRUS: Status: RESOLVED | Noted: 2020-10-19 | Resolved: 2021-08-27

## 2021-08-27 PROBLEM — R45.86 MOOD CHANGES: Status: RESOLVED | Noted: 2019-03-20 | Resolved: 2021-08-27

## 2021-08-27 PROBLEM — Z11.52 ENCOUNTER FOR SCREENING LABORATORY TESTING FOR COVID-19 VIRUS: Status: RESOLVED | Noted: 2020-10-19 | Resolved: 2021-08-27

## 2021-08-27 PROBLEM — Z23 NEED FOR HPV VACCINATION: Status: RESOLVED | Noted: 2020-08-21 | Resolved: 2021-08-27

## 2021-08-27 PROCEDURE — S0612 ANNUAL GYNECOLOGICAL EXAMINA: HCPCS | Performed by: PHYSICIAN ASSISTANT

## 2021-08-27 PROCEDURE — G0145 SCR C/V CYTO,THINLAYER,RESCR: HCPCS | Performed by: PHYSICIAN ASSISTANT

## 2021-08-27 PROCEDURE — G0476 HPV COMBO ASSAY CA SCREEN: HCPCS | Performed by: PHYSICIAN ASSISTANT

## 2021-08-27 RX ORDER — MEDROXYPROGESTERONE ACETATE 150 MG/ML
INJECTION, SUSPENSION INTRAMUSCULAR
Qty: 1 ML | Refills: 4 | Status: SHIPPED | OUTPATIENT
Start: 2021-08-27

## 2021-08-27 NOTE — PROGRESS NOTES
Luis Rahman  1985      CC:  Yearly exam    S:  29 y o  female here for yearly exam     Sexual activity: She is sexually active with her  without pain, bleeding or dryness  Contraception: She uses DepoProvera for contraception  She is amenorrheic with this  STD testing:  She does not want STD testing today  Gardasil:  She has had the Gardasil series  Last Pap 7/28/17 neg/neg  Last Mammo never    We reviewed ASCCP guidelines for Pap testing today       Family hx of breast cancer: no  Family hx of ovarian cancer: no  Family hx of colon cancer: no      Current Outpatient Medications:     acetaminophen (TYLENOL) 325 mg tablet, Take as needed for pain , Disp: 30 tablet, Rfl: 0    calcium citrate-vitamin D (CITRACAL+D) 315-200 MG-UNIT per tablet, Take 1 tablet by mouth 2 (two) times a day, Disp: , Rfl:     cetirizine (ZyrTEC) 10 MG chewable tablet, Chew 1 tablet (10 mg total) daily, Disp: 30 tablet, Rfl: 0    levocetirizine (XYZAL) 5 MG tablet, Take 5 mg by mouth every evening, Disp: , Rfl:     medroxyPROGESTERone acetate (DEPO-PROVERA SYRINGE) 150 mg/mL injection, Inject every 12 weeks, Disp: 1 Syringe, Rfl: 3    multivitamin (THERAGRAN) TABS, Take 1 tablet by mouth daily, Disp: , Rfl:     Current Facility-Administered Medications:     medroxyPROGESTERone (DEPO-PROVERA) IM injection 150 mg, 150 mg, Intramuscular, Q3 Months, Dina Mcgowan MD, 150 mg at 05/14/21 1105    medroxyPROGESTERone acetate (DEPO-PROVERA SYRINGE) IM injection 150 mg, 150 mg, Intramuscular, Q3 Months, Ronda Kaye PA-C, 150 mg at 06/21/19 1425    medroxyPROGESTERone acetate (DEPO-PROVERA SYRINGE) IM injection 150 mg, 150 mg, Intramuscular, Q3 Months, Ronda Kaye PA-C, 150 mg at 07/30/21 1214  Social History     Socioeconomic History    Marital status: /Civil Union     Spouse name: Not on file    Number of children: Not on file    Years of education: Not on file    Highest education level: Not on file   Occupational History    Not on file   Tobacco Use    Smoking status: Never Smoker    Smokeless tobacco: Never Used   Vaping Use    Vaping Use: Never used   Substance and Sexual Activity    Alcohol use: Yes     Comment: socially/rare    Drug use: No    Sexual activity: Yes     Partners: Male     Birth control/protection: Injection, Coitus interruptus     Comment: depo injection    Other Topics Concern    Not on file   Social History Narrative    Almost always uses seatbelt     Social Determinants of Health     Financial Resource Strain:     Difficulty of Paying Living Expenses:    Food Insecurity:     Worried About Running Out of Food in the Last Year:     Ran Out of Food in the Last Year:    Transportation Needs:     Lack of Transportation (Medical):  Lack of Transportation (Non-Medical):    Physical Activity:     Days of Exercise per Week:     Minutes of Exercise per Session:    Stress:     Feeling of Stress :    Social Connections:     Frequency of Communication with Friends and Family:     Frequency of Social Gatherings with Friends and Family:     Attends [employer] Services:     Active Member of Clubs or Organizations:     Attends Club or Organization Meetings:     Marital Status:    Intimate Partner Violence:     Fear of Current or Ex-Partner:     Emotionally Abused:     Physically Abused:     Sexually Abused:      Family History   Problem Relation Age of Onset    Miscarriages / Stillbirths Mother     Hyperlipidemia Father     Cancer Maternal Grandmother     COPD Paternal Grandfather       Past Medical History:   Diagnosis Date    Allergic rhinitis     Last assessed 06/02/12    Asthma     Last assessed 11/24/17    Cervical polyp     Last assessed 02/21/17    Disease of thyroid gland     nodule    Postcoital bleeding     Varicella     childhood        Review of Systems   Respiratory: Negative  Cardiovascular: Negative      Gastrointestinal: Negative for constipation and diarrhea  Genitourinary: Negative for difficulty urinating, pelvic pain, vaginal bleeding, vaginal discharge, itching or odor  O:  Blood pressure 130/78, height 5' 8 11" (1 73 m), weight 87 5 kg (193 lb), not currently breastfeeding  Patient appears well and is not in distress  Neck is supple without masses  Breasts are symmetrical without mass, tenderness, nipple discharge, skin changes or adenopathy  Abdomen is soft and nontender without masses  External genitals are normal without lesions or rashes  Urethral meatus and urethra are normal  Bladder is normal to palpation  Vagina is normal without discharge or bleeding  Cervix is normal without discharge or lesion  Uterus is normal, mobile, nontender without palpable mass  Adnexa are normal, nontender, without palpable mass  A:  Yearly exam      P:   Pap and HPV today     Depo sent to pharmacy      RTO one year for yearly exam or sooner as needed

## 2021-08-29 ENCOUNTER — OFFICE VISIT (OUTPATIENT)
Dept: URGENT CARE | Facility: CLINIC | Age: 36
End: 2021-08-29
Payer: COMMERCIAL

## 2021-08-29 VITALS
RESPIRATION RATE: 16 BRPM | BODY MASS INDEX: 28.79 KG/M2 | WEIGHT: 190 LBS | OXYGEN SATURATION: 98 % | HEIGHT: 68 IN | HEART RATE: 81 BPM | TEMPERATURE: 97.1 F

## 2021-08-29 DIAGNOSIS — J06.9 VIRAL URI WITH COUGH: Primary | ICD-10-CM

## 2021-08-29 PROCEDURE — U0005 INFEC AGEN DETEC AMPLI PROBE: HCPCS | Performed by: PHYSICIAN ASSISTANT

## 2021-08-29 PROCEDURE — S9083 URGENT CARE CENTER GLOBAL: HCPCS | Performed by: PHYSICIAN ASSISTANT

## 2021-08-29 PROCEDURE — U0003 INFECTIOUS AGENT DETECTION BY NUCLEIC ACID (DNA OR RNA); SEVERE ACUTE RESPIRATORY SYNDROME CORONAVIRUS 2 (SARS-COV-2) (CORONAVIRUS DISEASE [COVID-19]), AMPLIFIED PROBE TECHNIQUE, MAKING USE OF HIGH THROUGHPUT TECHNOLOGIES AS DESCRIBED BY CMS-2020-01-R: HCPCS | Performed by: PHYSICIAN ASSISTANT

## 2021-08-29 PROCEDURE — G0382 LEV 3 HOSP TYPE B ED VISIT: HCPCS | Performed by: PHYSICIAN ASSISTANT

## 2021-08-29 RX ORDER — FLUTICASONE PROPIONATE 50 MCG
1 SPRAY, SUSPENSION (ML) NASAL DAILY
Qty: 9.9 ML | Refills: 0 | Status: SHIPPED | OUTPATIENT
Start: 2021-08-29 | End: 2021-08-29

## 2021-08-29 RX ORDER — FLUTICASONE PROPIONATE 50 MCG
1 SPRAY, SUSPENSION (ML) NASAL DAILY
Qty: 9.9 ML | Refills: 0 | Status: SHIPPED | OUTPATIENT
Start: 2021-08-29 | End: 2022-04-06 | Stop reason: ALTCHOICE

## 2021-08-29 NOTE — PROGRESS NOTES
[facility]        NAME: Terrial Sacks is a 29 y o  female  : 1985    MRN: [id number]  DATE: 2021  TIME: 8:48 AM    Assessment and Plan   Viral URI with cough [J06 9]  1  Viral URI with cough  fluticasone (FLONASE) 50 mcg/act nasal spray    Novel Coronavirus (Covid-19),PCR [facility] - Office Collection         Patient Instructions     Swabbed for COVID-19, discussed self quarantine until contacted with results  Monitor for worsening symptoms  C/w OTC symptom relief including tylenol, fluids, rest  Recommend supplementing with vitamins D & C as well as a multivitamin  Use flonase as directed  Discussed etiology is most likely viral  Follow up with PCP in 3-5 days  Proceed to  ER if symptoms worsen  Chief Complaint     Chief Complaint   Patient presents with    Pain With Breathing     * Tightness - Symptoms began Wednesday,   She states she feels good for the most part, but has a concerning cough with no present mucus  She feels she is "breaking something up"  COVID-19 2020 W/ Pneumonia; Fully COVID-19 vx - 2021   Cough         History of Present Illness       Patient presents with complaint of cough for the past several days  She reports that it is worst in the morning and then sporadic for the remainder of the day  She notes some PND and states that it feels like there is some tightness in her chest which she cannot cough up  She denies fever, chills, sweats, dyspnea, SOB, congestion, sore throat, abdominal pain, nausea, vomiting, and diarrhea  She states that her daughter had similar symptoms and tested negative for COVID last week  She denies trying any palliative measures  She reports history of allergic rhinitis but denies taking anything for them regularly  Review of Systems   Review of Systems   Constitutional: Negative for chills and fever  HENT: Negative for ear pain and sore throat  Eyes: Negative for pain and visual disturbance     Respiratory: Positive for cough and chest tightness  Negative for shortness of breath  Cardiovascular: Negative for chest pain and palpitations  Gastrointestinal: Negative for abdominal pain and vomiting  Genitourinary: Negative for dysuria and hematuria  Musculoskeletal: Negative for arthralgias and back pain  Skin: Negative for color change and rash  Neurological: Negative for seizures and syncope  All other systems reviewed and are negative          Current Medications       Current Outpatient Medications:     acetaminophen (TYLENOL) 325 mg tablet, Take as needed for pain , Disp: 30 tablet, Rfl: 0    calcium citrate-vitamin D (CITRACAL+D) 315-200 MG-UNIT per tablet, Take 1 tablet by mouth 2 (two) times a day, Disp: , Rfl:     cetirizine (ZyrTEC) 10 MG chewable tablet, Chew 1 tablet (10 mg total) daily, Disp: 30 tablet, Rfl: 0    fluticasone (FLONASE) 50 mcg/act nasal spray, 1 spray into each nostril daily, Disp: 9 9 mL, Rfl: 0    levocetirizine (XYZAL) 5 MG tablet, Take 5 mg by mouth every evening, Disp: , Rfl:     medroxyPROGESTERone acetate (DEPO-PROVERA SYRINGE) 150 mg/mL injection, Inject every 12 weeks, Disp: 1 mL, Rfl: 4    multivitamin (THERAGRAN) TABS, Take 1 tablet by mouth daily, Disp: , Rfl:     Current Facility-Administered Medications:     medroxyPROGESTERone acetate (DEPO-PROVERA SYRINGE) IM injection 150 mg, 150 mg, Intramuscular, Q3 Months, Ronda Kaye PA-C, 150 mg at 06/21/19 1425    Current Allergies     Allergies as of 08/29/2021 - Reviewed 08/29/2021   Allergen Reaction Noted    Pollen extract Sneezing and Nasal Congestion 07/20/2018            The following portions of the patient's history were reviewed and updated as appropriate: allergies, current medications, past family history, past medical history, past social history, past surgical history and problem list      Past Medical History:   Diagnosis Date    Allergic rhinitis     Last assessed 06/02/12    Asthma     Last assessed 11/24/17    Cervical polyp     Last assessed 02/21/17    Disease of thyroid gland     nodule    Postcoital bleeding     Varicella     childhood       Past Surgical History:   Procedure Laterality Date    COLPOSCOPY      Polyp on cervix    LASIK      WISDOM TOOTH EXTRACTION         Family History   Problem Relation Age of Onset    Miscarriages / [place] Mother     Hyperlipidemia Father     Cancer Maternal Grandmother     COPD Paternal Grandfather          Medications have been verified  Objective   Pulse 81   Temp (!) 97 1 °F (36 2 °C) (Tympanic)   Resp 16   Ht 5' 8" (1 727 m)   Wt 86 2 kg (190 lb)   LMP  (LMP Unknown)   SpO2 98%   Breastfeeding No   BMI 28 89 kg/m²   No LMP recorded (lmp unknown)  Patient has had an injection  Physical Exam     Physical Exam  Vitals and nursing note reviewed  Constitutional:       General: She is not in acute distress  Appearance: Normal appearance  She is well-developed  She is not ill-appearing or diaphoretic  HENT:      Head: Normocephalic and atraumatic  Right Ear: Tympanic membrane, ear canal and external ear normal       Left Ear: Tympanic membrane, ear canal and external ear normal       Nose: Nose normal       Mouth/Throat:      Mouth: Mucous membranes are moist       Pharynx: Oropharynx is clear  No posterior oropharyngeal erythema  Eyes:      Conjunctiva/sclera: Conjunctivae normal       Pupils: Pupils are equal, round, and reactive to light  Cardiovascular:      Rate and Rhythm: Normal rate and regular rhythm  Heart sounds: Normal heart sounds  Pulmonary:      Effort: Pulmonary effort is normal  No respiratory distress  Breath sounds: Normal breath sounds  No stridor  No wheezing, rhonchi or rales  Musculoskeletal:      Cervical back: Normal range of motion and neck supple  Lymphadenopathy:      Cervical: No cervical adenopathy  Skin:     General: Skin is warm and dry        Capillary Refill: Capillary refill takes less than 2 seconds  Findings: No rash  Neurological:      Mental Status: She is alert and oriented to person, place, and time  Cranial Nerves: No cranial nerve deficit  Sensory: No sensory deficit  Psychiatric:         Behavior: Behavior normal          Thought Content:  Thought content normal

## 2021-08-30 LAB — SARS-COV-2 RNA RESP QL NAA+PROBE: NEGATIVE

## 2021-08-31 ENCOUNTER — TELEMEDICINE (OUTPATIENT)
Dept: FAMILY MEDICINE CLINIC | Facility: CLINIC | Age: 36
End: 2021-08-31
Payer: COMMERCIAL

## 2021-08-31 VITALS — HEIGHT: 68 IN | BODY MASS INDEX: 28.79 KG/M2 | WEIGHT: 190 LBS

## 2021-08-31 DIAGNOSIS — J98.01 ACUTE BRONCHOSPASM DUE TO VIRAL INFECTION: Primary | ICD-10-CM

## 2021-08-31 DIAGNOSIS — B34.9 ACUTE BRONCHOSPASM DUE TO VIRAL INFECTION: Primary | ICD-10-CM

## 2021-08-31 PROBLEM — J20.9 BRONCHITIS, ACUTE, WITH BRONCHOSPASM: Status: ACTIVE | Noted: 2021-08-31

## 2021-08-31 PROCEDURE — 1036F TOBACCO NON-USER: CPT | Performed by: FAMILY MEDICINE

## 2021-08-31 PROCEDURE — 3008F BODY MASS INDEX DOCD: CPT | Performed by: FAMILY MEDICINE

## 2021-08-31 PROCEDURE — 99213 OFFICE O/P EST LOW 20 MIN: CPT | Performed by: FAMILY MEDICINE

## 2021-08-31 RX ORDER — ALBUTEROL SULFATE 90 UG/1
2 AEROSOL, METERED RESPIRATORY (INHALATION) EVERY 4 HOURS PRN
Qty: 18 G | Refills: 1
Start: 2021-08-31

## 2021-08-31 RX ORDER — PREDNISONE 10 MG/1
TABLET ORAL
Qty: 21 TABLET | Refills: 0 | Status: SHIPPED | OUTPATIENT
Start: 2021-08-31 | End: 2022-01-14 | Stop reason: ALTCHOICE

## 2021-08-31 RX ORDER — BENZONATATE 100 MG/1
100 CAPSULE ORAL 3 TIMES DAILY PRN
Qty: 30 CAPSULE | Refills: 0 | Status: SHIPPED | OUTPATIENT
Start: 2021-08-31 | End: 2022-01-14 | Stop reason: ALTCHOICE

## 2021-08-31 NOTE — ASSESSMENT & PLAN NOTE
Recommended patient to stay well hydrated  Use Ventolin HFA inhaler 2 puffs every 4-6 hours as needed for chest tightness or wheezing  Will start on Prednisone taper  Take Tessalon 100 mg one capsule 3 times daily PRN for cough  Use Flonase nasal spray daily for postnasal drip  Call office if symptoms persist or worsen

## 2021-08-31 NOTE — PROGRESS NOTES
Virtual Regular Visit    Verification of patient location:    Patient is located in the following state in which I hold an active license [place]      Assessment/Plan:    Problem List Items Addressed This Visit        Respiratory    Acute bronchospasm due to viral infection - Primary     Recommended patient to stay well hydrated  Use Ventolin HFA inhaler 2 puffs every 4-6 hours as needed for chest tightness or wheezing  Will start on Prednisone taper  Take Tessalon 100 mg one capsule 3 times daily PRN for cough  Use Flonase nasal spray daily for postnasal drip  Call office if symptoms persist or worsen  Relevant Medications    predniSONE 10 mg tablet    albuterol (Ventolin HFA) 90 mcg/act inhaler    benzonatate (TESSALON PERLES) 100 mg capsule          BMI Counseling: Body mass index is 28 89 kg/m²  The BMI is above normal  Nutrition recommendations include encouraging healthy choices of fruits and vegetables, decreasing fast food intake, consuming healthier snacks, limiting drinks that contain sugar, moderation in carbohydrate intake, increasing intake of lean protein, reducing intake of saturated and trans fat and reducing intake of cholesterol  Exercise recommendations include exercising 3-5 times per week  No pharmacotherapy was ordered  Reason for visit is   Chief Complaint   Patient presents with    Virtual Regular Visit     Cough, Sinusitis, Allergies        Encounter provider Krys Good MD    Provider located at [address] [place] [address]  [address] [id number]      Recent Visits  No visits were found meeting these conditions    Showing recent visits within past 7 days and meeting all other requirements  Today's Visits  Date Type Provider Dept   08/31/21 Telemedicine Krys Good MD [address] today's visits and meeting all other requirements  Future Appointments  No visits were found meeting these conditions  Showing future appointments within next 150 days and meeting all other requirements       The patient was identified by name and date of birth  Luis Rahman was informed that this is a telemedicine visit and that the visit is being conducted through [address] [facility] [address] [facility] [address] [facility] and patient was informed that this is a secure, HIPAA-compliant platform  She agrees to proceed     My office door was closed  No one else was in the room  She acknowledged consent and understanding of privacy and security of the video platform  The patient has agreed to participate and understands they can discontinue the visit at any time  Patient is aware this is a billable service  Subjective  Luis Rahman is a 29 y o  female       HPI     Patient presents for virtual video visit c/o chest congestion, some chest tightness, dry cough, hoarseness of the voice  She was seen at urgent care center on 8/29/21, was diagnosed with viral URI  She was recommended to start Flonase nasal spray for postnasal drip and follow-up with PCP if no improvement in symptoms  Test for COVID-19 was negative  She started using Ventolin HFA inhaler this morning which she was prescribed last year  Patient had COVID 19 pneumonia in December 2020  She completed COVID vaccination in April 2021  Denies tobacco use  Past Medical History:   Diagnosis Date    Allergic rhinitis     Last assessed 06/02/12    Asthma     Last assessed 11/24/17    Cervical polyp     Last assessed 02/21/17    Disease of thyroid gland     nodule    Postcoital bleeding     Varicella     childhood       Past Surgical History:   Procedure Laterality Date    COLPOSCOPY      Polyp on cervix    LASIK      WISDOM TOOTH EXTRACTION         Current Outpatient Medications   Medication Sig Dispense Refill    acetaminophen (TYLENOL) 325 mg tablet Take as needed for pain   30 tablet 0    calcium citrate-vitamin D (CITRACAL+D) 315-200 MG-UNIT per tablet Take 1 tablet by mouth 2 (two) times a day      cetirizine (ZyrTEC) 10 MG chewable tablet Chew 1 tablet (10 mg total) daily 30 tablet 0    fluticasone (FLONASE) 50 mcg/act nasal spray 1 spray into each nostril daily 9 9 mL 0    levocetirizine (XYZAL) 5 MG tablet Take 5 mg by mouth every evening      medroxyPROGESTERone acetate (DEPO-PROVERA SYRINGE) 150 mg/mL injection Inject every 12 weeks 1 mL 4    multivitamin (THERAGRAN) TABS Take 1 tablet by mouth daily      albuterol (Ventolin HFA) 90 mcg/act inhaler Inhale 2 puffs every 4 (four) hours as needed for wheezing 18 g 1    benzonatate (TESSALON PERLES) 100 mg capsule Take 1 capsule (100 mg total) by mouth 3 (three) times a day as needed (for cough) 30 capsule 0    predniSONE 10 mg tablet Take 4 tab  for 3 days, then 2 tab  for 3 days, then 1 tab  for 3 days, then stop 21 tablet 0     Current Facility-Administered Medications   Medication Dose Route Frequency Provider Last Rate Last Admin    medroxyPROGESTERone acetate (DEPO-PROVERA SYRINGE) IM injection 150 mg  150 mg Intramuscular Q3 Months Ronda Kaye PA-C   150 mg at 06/21/19 1425        Allergies   Allergen Reactions    Pollen Extract Sneezing and Nasal Congestion       Review of Systems   Constitutional: Negative for activity change, appetite change, chills, fatigue and fever  HENT: Positive for congestion (mild), postnasal drip and voice change (hoarseness of the voice)  Negative for ear pain, mouth sores, nosebleeds and sore throat  Eyes: Negative for pain, discharge, redness, itching and visual disturbance  Respiratory: Positive for cough and chest tightness  Negative for shortness of breath and wheezing  Cardiovascular: Negative for chest pain and palpitations  Gastrointestinal: Negative for abdominal pain, diarrhea, nausea and vomiting  Musculoskeletal: Negative for arthralgias, myalgias and neck pain  Skin: Negative for rash     Neurological: Negative for dizziness and headaches  Hematological: Negative  Psychiatric/Behavioral: The patient is not nervous/anxious  Video Exam    Vitals:    08/31/21 1335   Weight: 86 2 kg (190 lb)   Height: 5' 8" (1 727 m)       Physical Exam  Vitals and nursing note reviewed  Constitutional:       Appearance: Normal appearance  She is not toxic-appearing  HENT:      Head: Normocephalic and atraumatic  Eyes:      Conjunctiva/sclera: Conjunctivae normal       Pupils: Pupils are equal, round, and reactive to light  Cardiovascular:      Comments: Denies chest pain   Pulmonary:      Effort: Pulmonary effort is normal       Breath sounds: No wheezing  Abdominal:      General: There is no distension  Tenderness: There is no abdominal tenderness  Musculoskeletal:         General: No swelling or tenderness  Normal range of motion  Cervical back: Normal range of motion and neck supple  Skin:     Findings: No rash  Neurological:      Mental Status: She is alert  I spent 15 minutes directly with the patient during this visit    VIRTUAL VISIT 1400 Tristen Ann verbally agrees to participate in [employer]  Pt is aware that [employer] could be limited without vital signs or the ability to perform a full hands-on physical exam  Annie Oakley understands she or the provider may request at any time to terminate the video visit and request the patient to seek care or treatment in person

## 2021-09-01 LAB
HPV HR 12 DNA CVX QL NAA+PROBE: NEGATIVE
HPV16 DNA CVX QL NAA+PROBE: NEGATIVE
HPV18 DNA CVX QL NAA+PROBE: NEGATIVE

## 2021-09-07 LAB
LAB AP GYN PRIMARY INTERPRETATION: NORMAL
Lab: NORMAL

## 2021-09-09 ENCOUNTER — OFFICE VISIT (OUTPATIENT)
Dept: FAMILY MEDICINE CLINIC | Facility: CLINIC | Age: 36
End: 2021-09-09
Payer: COMMERCIAL

## 2021-09-09 VITALS
OXYGEN SATURATION: 98 % | HEIGHT: 68 IN | SYSTOLIC BLOOD PRESSURE: 140 MMHG | TEMPERATURE: 100.1 F | HEART RATE: 87 BPM | BODY MASS INDEX: 28.98 KG/M2 | WEIGHT: 191.2 LBS | DIASTOLIC BLOOD PRESSURE: 90 MMHG | RESPIRATION RATE: 17 BRPM

## 2021-09-09 DIAGNOSIS — J20.9 ACUTE BRONCHITIS WITH BRONCHOSPASM: Primary | ICD-10-CM

## 2021-09-09 PROBLEM — Z87.09 H/O ACUTE BRONCHITIS WITH BRONCHOSPASM: Status: ACTIVE | Noted: 2021-09-09

## 2021-09-09 PROCEDURE — 3008F BODY MASS INDEX DOCD: CPT | Performed by: FAMILY MEDICINE

## 2021-09-09 PROCEDURE — 99214 OFFICE O/P EST MOD 30 MIN: CPT | Performed by: FAMILY MEDICINE

## 2021-09-09 PROCEDURE — 1036F TOBACCO NON-USER: CPT | Performed by: FAMILY MEDICINE

## 2021-09-09 RX ORDER — ALBUTEROL SULFATE 2.5 MG/3ML
2.5 SOLUTION RESPIRATORY (INHALATION) EVERY 6 HOURS PRN
Qty: 180 ML | Refills: 5 | Status: SHIPPED | OUTPATIENT
Start: 2021-09-09 | End: 2022-04-06 | Stop reason: ALTCHOICE

## 2021-09-09 RX ORDER — AZITHROMYCIN 250 MG/1
TABLET, FILM COATED ORAL
Qty: 6 TABLET | Refills: 0 | Status: SHIPPED | OUTPATIENT
Start: 2021-09-09 | End: 2021-09-13

## 2021-09-09 NOTE — ASSESSMENT & PLAN NOTE
Patient c/o non-pronproductive cough, chest tightness for the past 2 weeks  Will check chest x-ray to rule out pneumonia  Rx sent to the pharmacy for Z-pack  Recommended to take Tessalon 100 mg 3 times daily PRN for cough  Patient has a nebulizer machine at home  Start using Albuterol 2 5 mg via nebulizer every 6 hours  Continue to use Flonase nasal spray daily for postnasal drip  Will refer to pulmonology for evaluation, check PFT's to rule out asthma  Patient was advised to call office with update on symptoms in 4- 5 days or sooner if symptoms are worsening

## 2021-09-09 NOTE — PROGRESS NOTES
Assessment/Plan:    Acute bronchitis with bronchospasm  Patient c/o non-pronproductive cough, chest tightness for the past 2 weeks  Will check chest x-ray to rule out pneumonia  Rx sent to the pharmacy for Z-pack  Recommended to take Tessalon 100 mg 3 times daily PRN for cough  Patient has a nebulizer machine at home  Start using Albuterol 2 5 mg via nebulizer every 6 hours  Continue to use Flonase nasal spray daily for postnasal drip  Will refer to pulmonology for evaluation, check PFT's to rule out asthma  Patient was advised to call office with update on symptoms in 4- 5 days or sooner if symptoms are worsening  Diagnoses and all orders for this visit:    Acute bronchitis with bronchospasm  -     albuterol (2 5 mg/3 mL) 0 083 % nebulizer solution; Take 3 mL (2 5 mg total) by nebulization every 6 (six) hours as needed for wheezing or shortness of breath  -     azithromycin (ZITHROMAX) 250 mg tablet; Take 2 tablets today then 1 tablet daily x 4 days  -     XR chest pa & lateral; Future  -     Ambulatory referral to Pulmonology; Future          Subjective:      Patient ID: Oliver Mercedes is a 29 y o  female  HPI     Patient presents to the office c/o persistent non-productive cough, chest tightness for the past 2 weeks  Patient had telemedicine visit on August 31, 2021, was started on Prednisone taper with minimal relief in symptoms  She uses Ventolin HFA inhaler PRN which helps somewhat with chest tightness  She has not been taking Tessalon for cough due to possible side effects  She has been using Flonase nasal spray daily, started taking Zyrtec 5 mg daily  C/o some sinus pressure, nasal congestion the last 2 days  Reports no fever, chills  Denies SOB  Patient had COVID pneumonia in December 2020  She completed COVID vaccination in April 2021  Denies tobacco use  Patient states that she had asthma in childhood        The following portions of the patient's history were reviewed and updated as appropriate: allergies, past family history, past medical history, past social history, past surgical history and problem list     Review of Systems   Constitutional: Negative for activity change, appetite change, chills, fatigue and fever  HENT: Positive for congestion, postnasal drip and sinus pressure  Negative for ear pain, hearing loss, mouth sores, sore throat and trouble swallowing  Eyes: Negative  Respiratory: Positive for cough and chest tightness  Negative for shortness of breath and wheezing  Cardiovascular: Negative for chest pain, palpitations and leg swelling  Gastrointestinal: Negative for abdominal pain, diarrhea, nausea and vomiting  Musculoskeletal: Negative for arthralgias, myalgias and neck pain  Neurological: Negative for dizziness and headaches  Hematological: Negative            Objective:      /90 (BP Location: Left arm, Patient Position: Sitting, Cuff Size: Standard)   Pulse 87   Temp 100 1 °F (37 8 °C) (Tympanic)   Resp 17   Ht 5' 8" (1 727 m)   Wt 86 7 kg (191 lb 3 2 oz)   LMP  (LMP Unknown)   SpO2 98%   BMI 29 07 kg/m²          Physical Exam

## 2021-09-10 ENCOUNTER — APPOINTMENT (OUTPATIENT)
Dept: RADIOLOGY | Age: 36
End: 2021-09-10
Payer: COMMERCIAL

## 2021-09-10 DIAGNOSIS — J20.9 ACUTE BRONCHITIS WITH BRONCHOSPASM: ICD-10-CM

## 2021-09-10 PROCEDURE — 71046 X-RAY EXAM CHEST 2 VIEWS: CPT

## 2021-10-11 ENCOUNTER — TELEPHONE (OUTPATIENT)
Dept: PULMONOLOGY | Facility: CLINIC | Age: 36
End: 2021-10-11

## 2021-10-15 ENCOUNTER — CLINICAL SUPPORT (OUTPATIENT)
Dept: OBGYN CLINIC | Facility: CLINIC | Age: 36
End: 2021-10-15
Payer: COMMERCIAL

## 2021-10-15 VITALS — SYSTOLIC BLOOD PRESSURE: 114 MMHG | BODY MASS INDEX: 29.77 KG/M2 | WEIGHT: 195.8 LBS | DIASTOLIC BLOOD PRESSURE: 74 MMHG

## 2021-10-15 DIAGNOSIS — Z30.42 ENCOUNTER FOR DEPO-PROVERA CONTRACEPTION: Primary | ICD-10-CM

## 2021-10-15 PROCEDURE — 96372 THER/PROPH/DIAG INJ SC/IM: CPT | Performed by: PHYSICIAN ASSISTANT

## 2021-10-15 RX ADMIN — MEDROXYPROGESTERONE ACETATE 150 MG: 150 INJECTION, SUSPENSION INTRAMUSCULAR at 11:24

## 2021-10-20 ENCOUNTER — TELEPHONE (OUTPATIENT)
Dept: PULMONOLOGY | Facility: CLINIC | Age: 36
End: 2021-10-20

## 2021-10-26 ENCOUNTER — TELEPHONE (OUTPATIENT)
Dept: PULMONOLOGY | Facility: CLINIC | Age: 36
End: 2021-10-26

## 2022-01-14 ENCOUNTER — CLINICAL SUPPORT (OUTPATIENT)
Dept: OBGYN CLINIC | Facility: CLINIC | Age: 37
End: 2022-01-14
Payer: COMMERCIAL

## 2022-01-14 VITALS — DIASTOLIC BLOOD PRESSURE: 78 MMHG | BODY MASS INDEX: 30.23 KG/M2 | SYSTOLIC BLOOD PRESSURE: 126 MMHG | WEIGHT: 198.8 LBS

## 2022-01-14 DIAGNOSIS — Z30.42 ENCOUNTER FOR DEPO-PROVERA CONTRACEPTION: Primary | ICD-10-CM

## 2022-01-14 PROCEDURE — 96372 THER/PROPH/DIAG INJ SC/IM: CPT | Performed by: PHYSICIAN ASSISTANT

## 2022-01-14 RX ADMIN — MEDROXYPROGESTERONE ACETATE 150 MG: 150 INJECTION, SUSPENSION INTRAMUSCULAR at 09:37

## 2022-01-14 NOTE — PROGRESS NOTES
Pt is here for Depo Provera injection  Her last dose was 10/15/2021  Her annual exam was on 8/27/2021  Urine pregnancy test done: n/a   Result: n/a   Depo given in  L  Buttock  Tolerated well    Next dose due 3 months

## 2022-01-16 ENCOUNTER — OFFICE VISIT (OUTPATIENT)
Dept: URGENT CARE | Age: 37
End: 2022-01-16
Payer: COMMERCIAL

## 2022-01-16 ENCOUNTER — APPOINTMENT (OUTPATIENT)
Dept: RADIOLOGY | Age: 37
End: 2022-01-16
Payer: COMMERCIAL

## 2022-01-16 VITALS
DIASTOLIC BLOOD PRESSURE: 74 MMHG | RESPIRATION RATE: 16 BRPM | HEART RATE: 66 BPM | TEMPERATURE: 97.4 F | OXYGEN SATURATION: 99 % | SYSTOLIC BLOOD PRESSURE: 149 MMHG

## 2022-01-16 DIAGNOSIS — S93.401A SPRAIN OF RIGHT ANKLE, UNSPECIFIED LIGAMENT, INITIAL ENCOUNTER: Primary | ICD-10-CM

## 2022-01-16 DIAGNOSIS — S82.64XA CLOSED NONDISPLACED FRACTURE OF LATERAL MALLEOLUS OF RIGHT FIBULA, INITIAL ENCOUNTER: Primary | ICD-10-CM

## 2022-01-16 DIAGNOSIS — S93.401A SPRAIN OF RIGHT ANKLE, UNSPECIFIED LIGAMENT, INITIAL ENCOUNTER: ICD-10-CM

## 2022-01-16 PROCEDURE — S9083 URGENT CARE CENTER GLOBAL: HCPCS | Performed by: STUDENT IN AN ORGANIZED HEALTH CARE EDUCATION/TRAINING PROGRAM

## 2022-01-16 PROCEDURE — G0382 LEV 3 HOSP TYPE B ED VISIT: HCPCS | Performed by: STUDENT IN AN ORGANIZED HEALTH CARE EDUCATION/TRAINING PROGRAM

## 2022-01-16 PROCEDURE — 73610 X-RAY EXAM OF ANKLE: CPT

## 2022-01-16 NOTE — PROGRESS NOTES
[facility]        NAME: Fallon Paez is a 29 y o  female  : 1985    MRN: [id number]  DATE: 2022  TIME: 8:38 AM    Assessment and Plan   Sprain of right ankle, unspecified ligament, initial encounter [S93 401A]  1  Sprain of right ankle, unspecified ligament, initial encounter  XR ankle 3+ vw right     Patient's own ankle brace with strings applied  Rice therapy discussed with patient   Wet read shows no acute abnormalities osseous  Await x-ray final read    Patient Instructions       Follow up with PCP in 3-5 days  Proceed to  ER if symptoms worsen  Chief Complaint   No chief complaint on file  History of Present Illness       HPI   Patient states a right ankle twisting injury while she was playfully dancing at home last night  Patient denies any numbness tingling loss of sense her weakness in her lower extremity right side  She does state that there is sharp aching and shooting pain 8/10  Patient able to bear weight on it but has pain      Review of Systems   Review of Systems  Per hpi     Current Medications       Current Outpatient Medications:     acetaminophen (TYLENOL) 325 mg tablet, Take as needed for pain , Disp: 30 tablet, Rfl: 0    albuterol (2 5 mg/3 mL) 0 083 % nebulizer solution, Take 3 mL (2 5 mg total) by nebulization every 6 (six) hours as needed for wheezing or shortness of breath, Disp: 180 mL, Rfl: 5    albuterol (Ventolin HFA) 90 mcg/act inhaler, Inhale 2 puffs every 4 (four) hours as needed for wheezing, Disp: 18 g, Rfl: 1    cetirizine (ZyrTEC) 10 MG chewable tablet, Chew 1 tablet (10 mg total) daily, Disp: 30 tablet, Rfl: 0    fluticasone (FLONASE) 50 mcg/act nasal spray, 1 spray into each nostril daily, Disp: 9 9 mL, Rfl: 0    levocetirizine (XYZAL) 5 MG tablet, Take 5 mg by mouth every evening, Disp: , Rfl:     medroxyPROGESTERone acetate (DEPO-PROVERA SYRINGE) 150 mg/mL injection, Inject every 12 weeks, Disp: 1 mL, Rfl: 4    multivitamin (THERAGRAN) TABS, Take 1 tablet by mouth daily, Disp: , Rfl:     Current Facility-Administered Medications:     medroxyPROGESTERone acetate (DEPO-PROVERA SYRINGE) IM injection 150 mg, 150 mg, Intramuscular, Q3 Months, Ronda Kaye PA-C, 150 mg at 01/14/22 5031    Current Allergies     Allergies as of 01/16/2022 - Reviewed 01/14/2022   Allergen Reaction Noted    Pollen extract Sneezing and Nasal Congestion 07/20/2018            The following portions of the patient's history were reviewed and updated as appropriate: allergies, current medications, past family history, past medical history, past social history, past surgical history and problem list      Past Medical History:   Diagnosis Date    Allergic rhinitis     Last assessed 06/02/12    Asthma     Last assessed 11/24/17    Cervical polyp     Last assessed 02/21/17    Disease of thyroid gland     nodule    Postcoital bleeding     Varicella     childhood       Past Surgical History:   Procedure Laterality Date    COLPOSCOPY      Polyp on cervix    LASIK      WISDOM TOOTH EXTRACTION         Family History   Problem Relation Age of Onset    Miscarriages / [place] Mother     Hyperlipidemia Father     Cancer Maternal Grandmother     COPD Paternal Grandfather          Medications have been verified  Objective   LMP  (LMP Unknown)   No LMP recorded (lmp unknown)  Patient has had an injection  Physical Exam     Physical Exam  Constitutional:       General: She is not in acute distress  Appearance: Normal appearance  HENT:      Head: Normocephalic  Nose: No congestion or rhinorrhea  Mouth/Throat:      Mouth: Mucous membranes are moist       Pharynx: No oropharyngeal exudate or posterior oropharyngeal erythema  Eyes:      General:         Right eye: No discharge  Left eye: No discharge  Conjunctiva/sclera: Conjunctivae normal    Cardiovascular:      Rate and Rhythm: Normal rate and regular rhythm        Pulses: Normal pulses  Pulmonary:      Effort: Pulmonary effort is normal  No respiratory distress  Abdominal:      General: Abdomen is flat  There is no distension  Palpations: Abdomen is soft  Tenderness: There is no abdominal tenderness  Musculoskeletal:         General: Swelling present  No tenderness  Cervical back: Neck supple  Comments: Range of motion normal but her stated on dorsiflexion due to pain, mild swelling noted  Skin:     General: Skin is warm  Capillary Refill: Capillary refill takes less than 2 seconds  Neurological:      Mental Status: She is alert and oriented to person, place, and time                     Answers for HPI/ROS submitted by the patient on 1/15/2022  Incident occurred: 12 to 24 hours ago  Incident location: other  Injury mechanism: a twisting injury  Pain location: right ankle  Pain quality: aching, shooting  Pain - numeric: 8/10  Pain course: worsening  tingling: No  inability to bear weight: Yes  loss of motion: Yes  loss of sensation: No  muscle weakness: Yes  Foreign body present: no foreign bodies

## 2022-01-18 ENCOUNTER — OFFICE VISIT (OUTPATIENT)
Dept: OBGYN CLINIC | Facility: OTHER | Age: 37
End: 2022-01-18
Payer: COMMERCIAL

## 2022-01-18 VITALS
SYSTOLIC BLOOD PRESSURE: 142 MMHG | HEIGHT: 68 IN | DIASTOLIC BLOOD PRESSURE: 80 MMHG | WEIGHT: 194 LBS | HEART RATE: 73 BPM | BODY MASS INDEX: 29.4 KG/M2

## 2022-01-18 DIAGNOSIS — S99.911A RIGHT ANKLE INJURY, INITIAL ENCOUNTER: Primary | ICD-10-CM

## 2022-01-18 DIAGNOSIS — S82.891A CLOSED RIGHT ANKLE FRACTURE, INITIAL ENCOUNTER: ICD-10-CM

## 2022-01-18 PROCEDURE — 99243 OFF/OP CNSLTJ NEW/EST LOW 30: CPT | Performed by: ORTHOPAEDIC SURGERY

## 2022-01-18 PROCEDURE — 1036F TOBACCO NON-USER: CPT | Performed by: ORTHOPAEDIC SURGERY

## 2022-01-18 PROCEDURE — 3008F BODY MASS INDEX DOCD: CPT | Performed by: ORTHOPAEDIC SURGERY

## 2022-01-18 NOTE — LETTER
January 19, 2022     [facility], [address]    Patient: Jennifer Fitch   YOB: 1985   Date of Visit: 1/18/2022       Dear Dr Loraine Hernadez:    Thank you for referring Jhoana Steele to me for evaluation  Below are my notes for this consultation  If you have questions, please do not hesitate to call me  I look forward to following your patient along with you  Sincerely,        Patsy Das MD        CC: No Recipients  Jenelle Epley, DO  1/18/2022  8:18 PM  Cosign Needed  Chief Complaint: Right ankle injury     HPI:    Jennifer Ficth is a 29year old Female who presents today for new evaluation and treatment of right leg injury  Referred by Blue Louise MD      Athlete: No    Injury related: Yes, Inversion injury on 01/15/2022    Description of symptoms:  Patient presents for evaluation of right ankle injury  She sustained an injury on 01/15/2022 while she was in a bouncing play pen with a 11year-old and twisted her ankle inwardly  She was seen at urgent care and had x-rays done, thereafter was placed in a cam boot  Today she reports pain at her lateral ankle and some swelling  She states pain has improved slightly  She has not had any new injury  She denies any numbness or tingling  Summary of treatment to-date:  Cam boot    I have personally reviewed pertinent films in PACS  Xray right ankle 1/16/2022: Likely displaced avulsion fracture of the tip of the lateral malleolus       Patient Active Problem List   Diagnosis    Acute bronchitis with bronchospasm    H/O acute bronchitis with bronchospasm        Current Outpatient Medications on File Prior to Visit   Medication Sig Dispense Refill    acetaminophen (TYLENOL) 325 mg tablet Take as needed for pain   30 tablet 0    albuterol (Ventolin HFA) 90 mcg/act inhaler Inhale 2 puffs every 4 (four) hours as needed for wheezing 18 g 1    cetirizine (ZyrTEC) 10 MG chewable tablet Chew 1 tablet (10 mg total) daily 30 tablet 0    levocetirizine (XYZAL) 5 MG tablet Take 5 mg by mouth every evening      medroxyPROGESTERone acetate (DEPO-PROVERA SYRINGE) 150 mg/mL injection Inject every 12 weeks 1 mL 4    multivitamin (THERAGRAN) TABS Take 1 tablet by mouth daily      albuterol (2 5 mg/3 mL) 0 083 % nebulizer solution Take 3 mL (2 5 mg total) by nebulization every 6 (six) hours as needed for wheezing or shortness of breath 180 mL 5    fluticasone (FLONASE) 50 mcg/act nasal spray 1 spray into each nostril daily 9 9 mL 0     Current Facility-Administered Medications on File Prior to Visit   Medication Dose Route Frequency Provider Last Rate Last Admin    medroxyPROGESTERone acetate (DEPO-PROVERA SYRINGE) IM injection 150 mg  150 mg Intramuscular Q3 Months Ronda Kaye PA-C   150 mg at 01/14/22 7184        Allergies   Allergen Reactions    Pollen Extract Sneezing and Nasal Congestion        Tobacco Use: Low Risk     Smoking Tobacco Use: Never Smoker    Smokeless Tobacco Use: Never Used        Social Determinants of Health     Tobacco Use: Low Risk     Smoking Tobacco Use: Never Smoker    Smokeless Tobacco Use: Never Used   Alcohol Use: Not At Risk    Frequency of Alcohol Consumption: Never    Average Number of Drinks: 1 or 2    Frequency of Binge Drinking: Never   Financial Resource Strain: Not on file   Food Insecurity: Not on file   Transportation Needs: Not on file   Physical Activity: Not on file   Stress: Not on file   Social Connections: Not on file   Intimate Partner Violence: Not on file   Depression: Not on file   Housing Stability: Not on file               Review of Systems   Constitutional: Negative for chills and fever  HENT: Negative for ear pain and sore throat  Eyes: Negative for pain and visual disturbance  Respiratory: Negative for cough and shortness of breath  Cardiovascular: Negative for chest pain and palpitations     Gastrointestinal: Negative for abdominal pain and vomiting  Genitourinary: Negative for dysuria and hematuria  Musculoskeletal: Negative for arthralgias and back pain  Skin: Negative for color change and rash  Neurological: Negative for seizures and syncope  All other systems reviewed and are negative  Body mass index is 29 5 kg/m²  Physical Exam  Vitals and nursing note reviewed  Constitutional:       General: She is not in acute distress  Appearance: She is well-developed  HENT:      Head: Normocephalic and atraumatic  Eyes:      Conjunctiva/sclera: Conjunctivae normal    Cardiovascular:      Rate and Rhythm: Normal rate and regular rhythm  Heart sounds: No murmur heard  Pulmonary:      Effort: Pulmonary effort is normal  No respiratory distress  Breath sounds: Normal breath sounds  Abdominal:      Palpations: Abdomen is soft  Tenderness: There is no abdominal tenderness  Musculoskeletal:      Cervical back: Neck supple  Skin:     General: Skin is warm and dry  Neurological:      Mental Status: She is alert  Ortho Exam:    Body part: right ankle    Inspection:  Moderate swelling throughout ankle    Palpation:  Tenderness to palpation at lateral malleolus, ATFL and CFL    Range of motion:  Limited dorsal flexion and plantar flexion    Special Tests:  Anterior drawer test reproduces mild ankle pain, talar tilt reproduces lateral ankle pain  Negative calcaneal squeeze, negative Lisfranc    Distal Neurovascular Status: Intact, Yes    Procedures       Assessment:     Diagnosis ICD-10-CM Associated Orders   1  Right ankle injury, initial encounter  S91 911A    2  Closed right ankle fracture, initial encounter  S82 891R         Plan:    Discussed clinical exam and findings with Ramy Wood  We also reviewed her radiologic imaging  She has an avulsion fracture of the distal lateral malleolus tip  She is currently in a well-fitting cam boot for which she was advised to continue for the next 2 weeks    She can continue NSAIDs as needed for pain relief  Recommended icing and to keep her leg elevated to reduce swelling  We will see her for re-evaluation in 2 weeks and wean off cam boot at that point and can transition her to an Aircast   Follow-up in 2 weeks  Follow-Up:    Two weeks      Portions of the record may have been created with voice recognition software  Occasional wrong word or "sound alike" substitutions may have occurred due to the inherent limitations of voice recognition software  Please review the chart carefully and recognize, using context, where substitutions/typographical errors may have occurred

## 2022-01-18 NOTE — PROGRESS NOTES
Chief Complaint: Right ankle injury     HPI:    Ragini Harrington is a 29year old Female who presents today for new evaluation and treatment of right leg injury  Referred by Robel Cisneros MD      Athlete: No    Injury related: Yes, Inversion injury on 01/15/2022    Description of symptoms:  Patient presents for evaluation of right ankle injury  She sustained an injury on 01/15/2022 while she was in a bouncing play pen with a 11year-old and twisted her ankle inwardly  She was seen at urgent care and had x-rays done, thereafter was placed in a cam boot  Today she reports pain at her lateral ankle and some swelling  She states pain has improved slightly  She has not had any new injury  She denies any numbness or tingling  Summary of treatment to-date:  Cam boot    I have personally reviewed pertinent films in PACS  Xray right ankle 1/16/2022: Likely displaced avulsion fracture of the tip of the lateral malleolus       Patient Active Problem List   Diagnosis    Acute bronchitis with bronchospasm    H/O acute bronchitis with bronchospasm        Current Outpatient Medications on File Prior to Visit   Medication Sig Dispense Refill    acetaminophen (TYLENOL) 325 mg tablet Take as needed for pain   30 tablet 0    albuterol (Ventolin HFA) 90 mcg/act inhaler Inhale 2 puffs every 4 (four) hours as needed for wheezing 18 g 1    cetirizine (ZyrTEC) 10 MG chewable tablet Chew 1 tablet (10 mg total) daily 30 tablet 0    levocetirizine (XYZAL) 5 MG tablet Take 5 mg by mouth every evening      medroxyPROGESTERone acetate (DEPO-PROVERA SYRINGE) 150 mg/mL injection Inject every 12 weeks 1 mL 4    multivitamin (THERAGRAN) TABS Take 1 tablet by mouth daily      albuterol (2 5 mg/3 mL) 0 083 % nebulizer solution Take 3 mL (2 5 mg total) by nebulization every 6 (six) hours as needed for wheezing or shortness of breath 180 mL 5    fluticasone (FLONASE) 50 mcg/act nasal spray 1 spray into each nostril daily 9 9 mL 0 Current Facility-Administered Medications on File Prior to Visit   Medication Dose Route Frequency Provider Last Rate Last Admin    medroxyPROGESTERone acetate (DEPO-PROVERA SYRINGE) IM injection 150 mg  150 mg Intramuscular Q3 Months Kinjal Guevara PA-C   150 mg at 01/14/22 2139        Allergies   Allergen Reactions    Pollen Extract Sneezing and Nasal Congestion        Tobacco Use: Low Risk     Smoking Tobacco Use: Never Smoker    Smokeless Tobacco Use: Never Used        Social Determinants of Health     Tobacco Use: Low Risk     Smoking Tobacco Use: Never Smoker    Smokeless Tobacco Use: Never Used   Alcohol Use: Not At Risk    Frequency of Alcohol Consumption: Never    Average Number of Drinks: 1 or 2    Frequency of Binge Drinking: Never   Financial Resource Strain: Not on file   Food Insecurity: Not on file   Transportation Needs: Not on file   Physical Activity: Not on file   Stress: Not on file   Social Connections: Not on file   Intimate Partner Violence: Not on file   Depression: Not on file   Housing Stability: Not on file               Review of Systems   Constitutional: Negative for chills and fever  HENT: Negative for ear pain and sore throat  Eyes: Negative for pain and visual disturbance  Respiratory: Negative for cough and shortness of breath  Cardiovascular: Negative for chest pain and palpitations  Gastrointestinal: Negative for abdominal pain and vomiting  Genitourinary: Negative for dysuria and hematuria  Musculoskeletal: Negative for arthralgias and back pain  Skin: Negative for color change and rash  Neurological: Negative for seizures and syncope  All other systems reviewed and are negative  Body mass index is 29 5 kg/m²  Physical Exam  Vitals and nursing note reviewed  Constitutional:       General: She is not in acute distress  Appearance: She is well-developed  HENT:      Head: Normocephalic and atraumatic     Eyes: Conjunctiva/sclera: Conjunctivae normal    Cardiovascular:      Rate and Rhythm: Normal rate and regular rhythm  Heart sounds: No murmur heard  Pulmonary:      Effort: Pulmonary effort is normal  No respiratory distress  Breath sounds: Normal breath sounds  Abdominal:      Palpations: Abdomen is soft  Tenderness: There is no abdominal tenderness  Musculoskeletal:      Cervical back: Neck supple  Skin:     General: Skin is warm and dry  Neurological:      Mental Status: She is alert  Ortho Exam:    Body part: right ankle    Inspection:  Moderate swelling throughout ankle    Palpation:  Tenderness to palpation at lateral malleolus, ATFL and CFL    Range of motion:  Limited dorsal flexion and plantar flexion    Special Tests:  Anterior drawer test reproduces mild ankle pain, talar tilt reproduces lateral ankle pain  Negative calcaneal squeeze, negative Lisfranc    Distal Neurovascular Status: Intact, Yes    Procedures       Assessment:     Diagnosis ICD-10-CM Associated Orders   1  Right ankle injury, initial encounter  S99 911A    2  Closed right ankle fracture, initial encounter  S82 891A         Plan:    Discussed clinical exam and findings with Dionicio Sahil  We also reviewed her radiologic imaging  She has an avulsion fracture of the distal lateral malleolus tip  She is currently in a well-fitting cam boot for which she was advised to continue for the next 2 weeks  She can continue NSAIDs as needed for pain relief  Recommended icing and to keep her leg elevated to reduce swelling  We will see her for re-evaluation in 2 weeks and wean off cam boot at that point and can transition her to an Aircast   Follow-up in 2 weeks  Follow-Up:    Two weeks      Portions of the record may have been created with voice recognition software  Occasional wrong word or "sound alike" substitutions may have occurred due to the inherent limitations of voice recognition software   Please review the chart carefully and recognize, using context, where substitutions/typographical errors may have occurred

## 2022-02-01 ENCOUNTER — OFFICE VISIT (OUTPATIENT)
Dept: OBGYN CLINIC | Facility: OTHER | Age: 37
End: 2022-02-01
Payer: COMMERCIAL

## 2022-02-01 VITALS
HEIGHT: 68 IN | HEART RATE: 70 BPM | WEIGHT: 194 LBS | BODY MASS INDEX: 29.4 KG/M2 | DIASTOLIC BLOOD PRESSURE: 80 MMHG | SYSTOLIC BLOOD PRESSURE: 124 MMHG

## 2022-02-01 DIAGNOSIS — S82.61XD CLOSED AVULSION FRACTURE OF LATERAL MALLEOLUS OF RIGHT FIBULA WITH ROUTINE HEALING, SUBSEQUENT ENCOUNTER: Primary | ICD-10-CM

## 2022-02-01 PROBLEM — S82.63XD CLOSED AVULSION FRACTURE OF LATERAL MALLEOLUS WITH ROUTINE HEALING: Status: ACTIVE | Noted: 2022-02-01

## 2022-02-01 PROCEDURE — 99213 OFFICE O/P EST LOW 20 MIN: CPT | Performed by: ORTHOPAEDIC SURGERY

## 2022-02-01 PROCEDURE — 3008F BODY MASS INDEX DOCD: CPT | Performed by: ORTHOPAEDIC SURGERY

## 2022-02-01 PROCEDURE — 1036F TOBACCO NON-USER: CPT | Performed by: ORTHOPAEDIC SURGERY

## 2022-02-01 NOTE — PROGRESS NOTES
Chief Complaint: follow up right ankle injury    HPI:    Skyla Ayala is a 29year old Female who presents today for follow up of right lateral malleolus avulsion fracture  Athlete: No    Injury related: Yes, Inversion injury on 1/15/2022    Description of symptoms:  The patient reports that her right ankle pain is now significantly better  She still has some mild discomfort of the lateral ankle  Denies any new injury  She has been weight-bearing without her Cam boot as well intermittently and does not find any significant worsening of symptoms  Summary of treatment to-date: cam boot    I have personally reviewed pertinent films in PACS  Patient Active Problem List   Diagnosis    Acute bronchitis with bronchospasm    H/O acute bronchitis with bronchospasm        Current Outpatient Medications on File Prior to Visit   Medication Sig Dispense Refill    acetaminophen (TYLENOL) 325 mg tablet Take as needed for pain   30 tablet 0    albuterol (2 5 mg/3 mL) 0 083 % nebulizer solution Take 3 mL (2 5 mg total) by nebulization every 6 (six) hours as needed for wheezing or shortness of breath 180 mL 5    albuterol (Ventolin HFA) 90 mcg/act inhaler Inhale 2 puffs every 4 (four) hours as needed for wheezing 18 g 1    cetirizine (ZyrTEC) 10 MG chewable tablet Chew 1 tablet (10 mg total) daily 30 tablet 0    fluticasone (FLONASE) 50 mcg/act nasal spray 1 spray into each nostril daily 9 9 mL 0    levocetirizine (XYZAL) 5 MG tablet Take 5 mg by mouth every evening      medroxyPROGESTERone acetate (DEPO-PROVERA SYRINGE) 150 mg/mL injection Inject every 12 weeks 1 mL 4    multivitamin (THERAGRAN) TABS Take 1 tablet by mouth daily       Current Facility-Administered Medications on File Prior to Visit   Medication Dose Route Frequency Provider Last Rate Last Admin    medroxyPROGESTERone acetate (DEPO-PROVERA SYRINGE) IM injection 150 mg  150 mg Intramuscular Q3 Months Mayra Richardson PA-C   150 mg at 01/14/22 0422        Allergies   Allergen Reactions    Pollen Extract Sneezing and Nasal Congestion        Tobacco Use: Low Risk     Smoking Tobacco Use: Never Smoker    Smokeless Tobacco Use: Never Used        Social Determinants of Health     Tobacco Use: Low Risk     Smoking Tobacco Use: Never Smoker    Smokeless Tobacco Use: Never Used   Alcohol Use: Not At Risk    Frequency of Alcohol Consumption: Never    Average Number of Drinks: 1 or 2    Frequency of Binge Drinking: Never   Financial Resource Strain: Not on file   Food Insecurity: Not on file   Transportation Needs: Not on file   Physical Activity: Not on file   Stress: Not on file   Social Connections: Not on file   Intimate Partner Violence: Not on file   Depression: Not on file   Housing Stability: Not on file               Review of Systems   Constitutional: Negative for chills and fever  HENT: Negative for ear pain and sore throat  Eyes: Negative for pain and visual disturbance  Respiratory: Negative for cough and shortness of breath  Cardiovascular: Negative for chest pain and palpitations  Gastrointestinal: Negative for abdominal pain and vomiting  Genitourinary: Negative for dysuria and hematuria  Musculoskeletal: Negative for arthralgias and back pain  Skin: Negative for color change and rash  Neurological: Negative for seizures and syncope  All other systems reviewed and are negative  There is no height or weight on file to calculate BMI  Physical Exam  Vitals and nursing note reviewed  Constitutional:       General: She is not in acute distress  Appearance: She is well-developed  HENT:      Head: Normocephalic and atraumatic  Eyes:      Conjunctiva/sclera: Conjunctivae normal    Cardiovascular:      Rate and Rhythm: Normal rate and regular rhythm  Heart sounds: No murmur heard  Pulmonary:      Effort: Pulmonary effort is normal  No respiratory distress  Breath sounds: Normal breath sounds  Abdominal:      Palpations: Abdomen is soft  Tenderness: There is no abdominal tenderness  Musculoskeletal:      Cervical back: Neck supple  Skin:     General: Skin is warm and dry  Neurological:      Mental Status: She is alert  Ortho Exam:    Body part: right ankle    Inspection:  Minimal swelling of the lateral malleolus  No ecchymosis  Palpation:  Only mild discomfort to palpation over the lateral malleolus  Negative syndesmotic squeeze  Negative calcaneal squeeze  Range of motion:  Some limitation of right ankle plantar flexion and dorsiflexion  Mild discomfort only with inversion  Special Tests:  No significant laxity with anterior drawer test or talar tilt test   No discomfort with passive external rotation of the right ankle  No discomfort with passive midfoot motion or metatarsal squeeze  Distal Neurovascular Status: Intact, Yes    Procedures       Assessment:     Diagnosis ICD-10-CM Associated Orders   1  Closed avulsion fracture of lateral malleolus of right fibula with routine healing, subsequent encounter  S82  61XD Ambulatory Referral to Physical Therapy        Plan:    I explained my current clinical findings to Iraj Delgado today  She is progressing well with regards to her closed lateral malleolar tip avulsion fracture  At this time I have advised her to discontinue her Cam boot and transition into an Aircast brace as needed I will also prescribe physical therapy rehabilitation for her  Follow-up in about 6 weeks time for clinical reassessment and possible discharge from clinical care if there are no further concerns  Portions of the record may have been created with voice recognition software  Occasional wrong word or "sound alike" substitutions may have occurred due to the inherent limitations of voice recognition software  Please review the chart carefully and recognize, using context, where substitutions/typographical errors may have occurred

## 2022-02-09 ENCOUNTER — EVALUATION (OUTPATIENT)
Dept: PHYSICAL THERAPY | Facility: OTHER | Age: 37
End: 2022-02-09
Payer: COMMERCIAL

## 2022-02-09 DIAGNOSIS — S82.61XD CLOSED AVULSION FRACTURE OF LATERAL MALLEOLUS OF RIGHT FIBULA WITH ROUTINE HEALING, SUBSEQUENT ENCOUNTER: Primary | ICD-10-CM

## 2022-02-09 PROCEDURE — 97140 MANUAL THERAPY 1/> REGIONS: CPT | Performed by: PHYSICAL THERAPIST

## 2022-02-09 PROCEDURE — 97110 THERAPEUTIC EXERCISES: CPT | Performed by: PHYSICAL THERAPIST

## 2022-02-09 PROCEDURE — 97162 PT EVAL MOD COMPLEX 30 MIN: CPT | Performed by: PHYSICAL THERAPIST

## 2022-02-09 NOTE — PROGRESS NOTES
PT Evaluation     Today's date: 2022  Patient name: Darlene Cosby  : 1985  MRN: [id number]  Referring provider: Chito Colvin MD  Dx:   Encounter Diagnosis     ICD-10-CM    1  Closed avulsion fracture of lateral malleolus of right fibula with routine healing, subsequent encounter  S82  61XD Ambulatory Referral to Physical Therapy       Start Time: 1300  Stop Time: 1400  Total time in clinic (min): 60 minutes    Assessment  Assessment details: Darlene Cosby is a 29 y o  female who presents with complaints of closed avulsion fracture of lateral malleolus of right fibula with routine healing, subsequent encounter  (primary encounter diagnosis)  No further referral appears necessary at this time based upon examination results  Patient presents to PT with impaired strength, impaired ROM, decreased flexibility and impaired ability to complete IADLs  Prognosis is good given HEP compliance and PT 2-3x/wk  Positive prognostic indicators include positive attitude toward recovery  Please contact me if you have any questions or recommendations  Thank you for the opportunity to share in  [facility] care         Impairments: abnormal coordination, abnormal muscle firing, abnormal or restricted ROM, activity intolerance, impaired physical strength, lacks appropriate home exercise program, pain with function and poor body mechanics    Symptom irritability: moderateBarriers to therapy: History of pregnancy   Understanding of Dx/Px/POC: good   Prognosis: good    Goals  Short Term:  Pt will report decreased levels of pain by at least 2 subjective ratings in 4 weeks  Pt will demonstrate improved ROM by at least 10 degrees in 4 weeks  Pt will demonstrate improved strength by 1/2 grade  Long Term:   Pt will be independent in their HEP in 8 weeks  Pt will be be pain free with IADL's  Pt will demonstrate improved FOTO, > 80         Plan  Plan details: Prognosis above is given PT services 2x/week tapering to 1x/week over the next 3 months and home program adherence  Patient would benefit from: skilled physical therapy  Planned modality interventions: thermotherapy: hydrocollator packs, cryotherapy, electrical stimulation/Russian stimulation and low level laser therapy  Planned therapy interventions: activity modification, joint mobilization, manual therapy, motor coordination training, neuromuscular re-education, patient education, self care, therapeutic activities, therapeutic exercise, graded activity, home exercise program, balance, strengthening, stretching, functional ROM exercises and flexibility  Frequency: 2x week  Duration in weeks: 12  Plan of Care beginning date: 5/9/2022  Treatment plan discussed with: patient        Subjective Evaluation    History of Present Illness  Mechanism of injury: Patient reports she was in a boot for approximately 2 weeks after the incident  She said she was at her son's fifth birthday party and she rolled the ankle while jumping  Patient went to an Urgent Care  They initially didn't see the fracture  Then a few hours after she left, she was called and told orthopedics would be calling  Patient, as stated, was in a boot for 2 weeks and now she is in an [employer] as of now  Pain   R Ankle   Currently 0/10  At Best 0/10  At [facility] [place] 5/10  Patient described the pain as more of a discomfort/stiffness  AGGS: sitting at the office  EASES: movement, no sprinting, turning  Patient's Goal: "I want to get back to lifting "           Objective     General Comments:       Ankle/Foot Comments   LQS: WNL     Observation/Palpation: increased stiffness with palpation; abnormal gait pattern due to AirCast; minimal tenderness with palpation in the region of the avulsion of fracture    Segmental Mobility Testing:   Hypomobile AP TCJ   Hypo STJ ML/LM   Hypo midfoot   Hypo CCJ     ROM   DF -15*   PF 55*   Inv 30*   Ev 15*   Great Toe DF 25*     Strength   DF 3-/5   PF 3-/5   Inv 4/5   Ev 4/5   Great Toe DF 5/5     Precautions: Universal     Daily Treatment Log   Manuals             GISTM              Ankle Mobs                                       Neuro Re-Ed             Toe Curls             Arch Foot              Toe Yoga             Ankle 4 Way                                                     Ther Ex             Curve              Alter G              Bike              Step Ups              Lat Step Ups              Squats             Lunges                          Ther Activity                                       Gait Training                                       Modalities

## 2022-02-15 ENCOUNTER — APPOINTMENT (OUTPATIENT)
Dept: PHYSICAL THERAPY | Facility: OTHER | Age: 37
End: 2022-02-15
Payer: COMMERCIAL

## 2022-02-17 ENCOUNTER — OFFICE VISIT (OUTPATIENT)
Dept: PHYSICAL THERAPY | Facility: OTHER | Age: 37
End: 2022-02-17
Payer: COMMERCIAL

## 2022-02-17 DIAGNOSIS — S82.61XD CLOSED AVULSION FRACTURE OF LATERAL MALLEOLUS OF RIGHT FIBULA WITH ROUTINE HEALING, SUBSEQUENT ENCOUNTER: Primary | ICD-10-CM

## 2022-02-17 PROCEDURE — 97110 THERAPEUTIC EXERCISES: CPT | Performed by: PHYSICAL THERAPIST

## 2022-02-17 PROCEDURE — 97112 NEUROMUSCULAR REEDUCATION: CPT | Performed by: PHYSICAL THERAPIST

## 2022-02-17 PROCEDURE — 97140 MANUAL THERAPY 1/> REGIONS: CPT | Performed by: PHYSICAL THERAPIST

## 2022-02-17 NOTE — PROGRESS NOTES
Daily Note     Today's date: 2022  Patient name: Stefan Cespedes  : 1985  MRN: [id number]  Referring provider: Tea Donis MD  Dx:   Encounter Diagnosis     ICD-10-CM    1  Closed avulsion fracture of lateral malleolus of right fibula with routine healing, subsequent encounter  S82  61XD        Start Time: 1115  Stop Time: 1215  Total time in clinic (min): 60 minutes    Subjective: Patient reports her ankle was feeling really good prior to today  She said she feels occasional "pulls" in the medial aspect of her ankle  She injured the lateral ankle  PT explained that it is most likely a bone bruise in the medial ankle region  Objective: See treatment diary below    Assessment: Tolerated treatment well  Patient demonstrated fatigue post treatment, exhibited good technique with therapeutic exercises and would benefit from continued PT    Plan: Continue per plan of care       Precautions: N/A      Daily Treatment Log   Manuals        Pablo Tape [facility]       AP LEANDRO [facility]       PA LEANDRO [facility]        FR  [facility]       [facility]       Neuro Re-Ed         Pablo Squats 50x       Pablo Step Ups 50x       Pablo DF  50x       Toe Curls 30x       Toe Yoga 20 x 5"        Short Arch 20 x 5"                Ther Ex        Slantboard 10 x 10"        Bike  10'                                                        Ther Activity                        Gait Training                        Modalities        Clarice [facility]

## 2022-02-22 ENCOUNTER — OFFICE VISIT (OUTPATIENT)
Dept: PHYSICAL THERAPY | Facility: OTHER | Age: 37
End: 2022-02-22
Payer: COMMERCIAL

## 2022-02-22 DIAGNOSIS — S82.61XD CLOSED AVULSION FRACTURE OF LATERAL MALLEOLUS OF RIGHT FIBULA WITH ROUTINE HEALING, SUBSEQUENT ENCOUNTER: Primary | ICD-10-CM

## 2022-02-22 PROCEDURE — 97110 THERAPEUTIC EXERCISES: CPT | Performed by: PHYSICAL THERAPIST

## 2022-02-22 PROCEDURE — 97140 MANUAL THERAPY 1/> REGIONS: CPT | Performed by: PHYSICAL THERAPIST

## 2022-02-22 PROCEDURE — 97112 NEUROMUSCULAR REEDUCATION: CPT | Performed by: PHYSICAL THERAPIST

## 2022-02-22 NOTE — PROGRESS NOTES
Daily Note     Today's date: 2022  Patient name: Rodolfo Muniz  : 1985  MRN: [id number]  Referring provider: Lisbeth Archer MD  Dx:   Encounter Diagnosis     ICD-10-CM    1  Closed avulsion fracture of lateral malleolus of right fibula with routine healing, subsequent encounter  S82  61XD      1 on 1 entire duration   Start Time: 1130  Stop Time: 1230  Total time in clinic (min): 60 minutes    Subjective: Patient reports her ankle was feeling really good  She said she is limited with her ability to DF her ankle  Objective: See treatment diary below    Assessment: Tolerated treatment well  Patient demonstrated fatigue post treatment, exhibited good technique with therapeutic exercises and would benefit from continued PT    Plan: Continue per plan of care       Precautions: N/A      Daily Treatment Log   Manuals       Pablo Tape [facility]       AP TCJ [address]      PA TCJ Mauricio [facility]              Neuro Re-Ed        Pablo Squats 50x       Pablo Step Ups 50x       Shah DF  50x       Toe Curls 30x 30x       Toe Yoga 20 x 5"  30 x 5"       Short Arch 20 x 5"  30 x 5"       Ankle DF MWM  30x       Ther Ex       Slantboard 10 x 10"  10 x 10"       Bike  10'  10'                                                       Ther Activity                        Gait Training                        Modalities        [facility]

## 2022-02-25 ENCOUNTER — APPOINTMENT (OUTPATIENT)
Dept: PHYSICAL THERAPY | Facility: OTHER | Age: 37
End: 2022-02-25
Payer: COMMERCIAL

## 2022-03-03 ENCOUNTER — OFFICE VISIT (OUTPATIENT)
Dept: PHYSICAL THERAPY | Facility: OTHER | Age: 37
End: 2022-03-03
Payer: COMMERCIAL

## 2022-03-03 DIAGNOSIS — S82.61XD CLOSED AVULSION FRACTURE OF LATERAL MALLEOLUS OF RIGHT FIBULA WITH ROUTINE HEALING, SUBSEQUENT ENCOUNTER: Primary | ICD-10-CM

## 2022-03-03 PROCEDURE — 97110 THERAPEUTIC EXERCISES: CPT | Performed by: PEDIATRICS

## 2022-03-03 PROCEDURE — 97112 NEUROMUSCULAR REEDUCATION: CPT | Performed by: PEDIATRICS

## 2022-03-03 PROCEDURE — 97140 MANUAL THERAPY 1/> REGIONS: CPT | Performed by: PEDIATRICS

## 2022-03-03 NOTE — PROGRESS NOTES
Daily Note     Today's date: 3/3/2022  Patient name: Soraida Rosa  : 1985  MRN: [id number]  Referring provider: Inés Jalloh MD  Dx:   Encounter Diagnosis     ICD-10-CM    1  Closed avulsion fracture of lateral malleolus of right fibula with routine healing, subsequent encounter  S82  61XD      1 on 1 entire duration              Subjective: Patient reports her ankle was feeling really good  She said she is limited with her ability to DF her ankle  She states she feels like she gets stuck when she attempts to perform narrow stance squat  She states when her knees go over her toes she gets slight discomfort on medial aspect of ankle  Objective: See treatment diary below    Assessment: Tolerated treatment well  Decreased discomfort with narrow stance squat post manual interventions  Patient is responding well to current interventions  Patient demonstrated fatigue post treatment, exhibited good technique with therapeutic exercises and would benefit from continued PT    Plan: Continue per plan of care       Precautions: N/A      Daily Treatment Log   Manuals  3/3     Pablo Tape [facility]       AP TCJ [address]      PA TCJ Mobs [facility]        FR  [facility]       GISTM  [facility]       MFDc   [facility] EW     STJ   [facility]     Cuneiform mob   [facility]     Navicular mob   [facility]     Neuro Re-Ed        Shah Squats 50x       Shah Step Ups 50x       Shah DF  50x       Toe Curls 30x 30x       Toe Yoga 20 x 5"  30 x 5"       Short Arch 20 x 5"  30 x 5"       Ankle DF MWM  30x  30x     Ther Ex       Slantboard 10 x 10"  10 x 10"  30"x3     Bike  10'  10'       Heel tap   4"  2x15     Decline squat   3x10     ecc heel raises   30x     Post tib heel raises   30x     Toe raises   30x     Narrow stance squat   20x     Ther Activity                        Gait Training                        Modalities        [facility]

## 2022-03-11 ENCOUNTER — OFFICE VISIT (OUTPATIENT)
Dept: PHYSICAL THERAPY | Facility: OTHER | Age: 37
End: 2022-03-11
Payer: COMMERCIAL

## 2022-03-11 DIAGNOSIS — S82.61XD CLOSED AVULSION FRACTURE OF LATERAL MALLEOLUS OF RIGHT FIBULA WITH ROUTINE HEALING, SUBSEQUENT ENCOUNTER: Primary | ICD-10-CM

## 2022-03-11 PROCEDURE — 97110 THERAPEUTIC EXERCISES: CPT | Performed by: PEDIATRICS

## 2022-03-11 PROCEDURE — 97140 MANUAL THERAPY 1/> REGIONS: CPT | Performed by: PEDIATRICS

## 2022-03-11 PROCEDURE — 97112 NEUROMUSCULAR REEDUCATION: CPT | Performed by: PEDIATRICS

## 2022-03-11 NOTE — PROGRESS NOTES
Daily Note     Today's date: 3/11/2022  Patient name: Leidy Sanchez  : 1985  MRN: [id number]  Referring provider: Hosea Yang MD  Dx:   Encounter Diagnosis     ICD-10-CM    1  Closed avulsion fracture of lateral malleolus of right fibula with routine healing, subsequent encounter  S82  61XD      1 on 1 entire duration              Subjective: Patient reports her ankle is getting better each week  Still feeling restricted in the ankle when she squats  Objective: See treatment diary below    Assessment: Tolerated treatment well  Will continue to progress ankle stability and proprioception  Add bosu and rockerboard activities NV  Patient demonstrated fatigue post treatment, exhibited good technique with therapeutic exercises and would benefit from continued PT    Plan: Continue per plan of care       Precautions: N/A      Daily Treatment Log   Manuals 2/17 2/22 3/3 3/11    Shah Tape [facility]       AP TCJ Mobs [facility]  MJ    PA TCJ Mobs [facility]    MJ    FR  [facility]       GISTM  [facility]       MFDc   [facility] EW     STJ   [facility] MJ    Cuneiform mob   [facility]     Navicular mob   [facility]                             Neuro Re-Ed        Shah Squats 50x       Shah Step Ups 50x       Shah DF  50x       Toe Curls 30x 30x       Toe Yoga 20 x 5"  30 x 5"       Short Arch 20 x 5"  30 x 5"       Ankle DF MWM  30x  30x 30x    Cone dips w/ short foot    2 laps  Green foam  12" box    TB SL toe taps and bird dips    20x ea    X-band side stepping    Black  3 laps    Ther Ex       Slantboard 10 x 10"  10 x 10"  30"x3     Bike  10'  10'       Heel tap   4"  2x15 6"  2x15    Decline squat   3x10 3x10  25# KB    ecc heel raises   30x     Post tib heel raises   30x 30x    Toe raises   30x     Narrow stance squat   20x 20x    Ther Activity                        Gait Training                        Modalities        [facility]

## 2022-03-14 NOTE — PROGRESS NOTES
Chief Complaint:  Follow-up right ankle injury    HPI:    Pio Graff is a 29year old Female who presents today for follow up of right lateral malleolus avulsion fracture  She was last seen and examined on 02/01/2022 for this  Her cam boot was discontinued and she was given an air cast to use as needed  She was also started on physical therapy for ankle rehabilitation  Athlete: No    Injury related: Yes, Inversion injury on 01/15/2022    Description of symptoms:  Patient reports significant improvements in her symptoms since last visit  She states discontinued use of the Aircast as this felt bulky and actually decreased her mobility  She has been doing well and has no pain today  She denies any new injury  Summary of treatment to-date:  Cam boot, physical therapy and air cast    I have personally reviewed pertinent films in PACS  Patient Active Problem List   Diagnosis    Acute bronchitis with bronchospasm    H/O acute bronchitis with bronchospasm    Closed avulsion fracture of lateral malleolus with routine healing        Current Outpatient Medications on File Prior to Visit   Medication Sig Dispense Refill    acetaminophen (TYLENOL) 325 mg tablet Take as needed for pain   30 tablet 0    albuterol (Ventolin HFA) 90 mcg/act inhaler Inhale 2 puffs every 4 (four) hours as needed for wheezing 18 g 1    cetirizine (ZyrTEC) 10 MG chewable tablet Chew 1 tablet (10 mg total) daily 30 tablet 0    levocetirizine (XYZAL) 5 MG tablet Take 5 mg by mouth every evening      medroxyPROGESTERone acetate (DEPO-PROVERA SYRINGE) 150 mg/mL injection Inject every 12 weeks 1 mL 4    multivitamin (THERAGRAN) TABS Take 1 tablet by mouth daily      albuterol (2 5 mg/3 mL) 0 083 % nebulizer solution Take 3 mL (2 5 mg total) by nebulization every 6 (six) hours as needed for wheezing or shortness of breath 180 mL 5    fluticasone (FLONASE) 50 mcg/act nasal spray 1 spray into each nostril daily 9 9 mL 0     Current Facility-Administered Medications on File Prior to Visit   Medication Dose Route Frequency Provider Last Rate Last Admin    medroxyPROGESTERone acetate (DEPO-PROVERA SYRINGE) IM injection 150 mg  150 mg Intramuscular Q3 Months Alfred Barker PA-C   150 mg at 01/14/22 7223        Allergies   Allergen Reactions    Pollen Extract Sneezing and Nasal Congestion        Tobacco Use: Low Risk     Smoking Tobacco Use: Never Smoker    Smokeless Tobacco Use: Never Used        Social Determinants of Health     Tobacco Use: Low Risk     Smoking Tobacco Use: Never Smoker    Smokeless Tobacco Use: Never Used   Alcohol Use: Not At Risk    Frequency of Alcohol Consumption: Never    Average Number of Drinks: 1 or 2    Frequency of Binge Drinking: Never   Financial Resource Strain: Not on file   Food Insecurity: Not on file   Transportation Needs: Not on file   Physical Activity: Not on file   Stress: Not on file   Social Connections: Not on file   Intimate Partner Violence: Not on file   Depression: Not on file   Housing Stability: Not on file               Review of Systems   Constitutional: Negative for chills and fever  HENT: Negative for ear pain and sore throat  Eyes: Negative for pain and visual disturbance  Respiratory: Negative for cough and shortness of breath  Cardiovascular: Negative for chest pain and palpitations  Gastrointestinal: Negative for abdominal pain and vomiting  Genitourinary: Negative for dysuria and hematuria  Musculoskeletal: Negative for arthralgias and back pain  Skin: Negative for color change and rash  Neurological: Negative for seizures and syncope  All other systems reviewed and are negative  Body mass index is 29 42 kg/m²  Physical Exam  Vitals and nursing note reviewed  Constitutional:       General: She is not in acute distress  Appearance: She is well-developed  HENT:      Head: Normocephalic and atraumatic     Eyes:      Conjunctiva/sclera: Conjunctivae normal    Cardiovascular:      Rate and Rhythm: Normal rate and regular rhythm  Heart sounds: No murmur heard  Pulmonary:      Effort: Pulmonary effort is normal  No respiratory distress  Breath sounds: Normal breath sounds  Abdominal:      Palpations: Abdomen is soft  Tenderness: There is no abdominal tenderness  Musculoskeletal:      Cervical back: Neck supple  Skin:     General: Skin is warm and dry  Neurological:      Mental Status: She is alert  Ortho Exam:    Body part: right ankle    Inspection:  No deformity or swelling noted  Palpation:  No tenderness to palpation at lateral malleolus or throughout ankle  Range of motion:  Full dorsiflexion and plantar flexion of ankle  Inversion and eversion intact  Resisted inversion and eversion does not reproduce any pain  Special Tests:  Negative syndesmotic squeeze, negative anterior drawer test   Negative talar tilt  Functional testing one leg jump does not produce any discomfort  Single leg calf raises produce some mild discomfort  Lunge testing indicative of a tight Achilles  Distal Neurovascular Status: Intact, Yes    Procedures       Assessment:     Diagnosis ICD-10-CM Associated Orders   1  Closed avulsion fracture of lateral malleolus of right fibula with routine healing, subsequent encounter  S82  61XD         Plan:    Discussed clinical exam and findings with Mathew Amado  We also reviewed her radiologic imaging  Her avulsion fracture of lateral malleolus of her right fibula has been healing well  I do not think she requires any new x-rays today  I have recommended to continue physical therapy to walk on ankle strengthening and Achilles stretching  Will follow up with her as needed  She can return or call us if she develops any new symptoms or has any questions  Follow-Up:    As needed        Portions of the record may have been created with voice recognition software   Occasional wrong word or "sound alike" substitutions may have occurred due to the inherent limitations of voice recognition software  Please review the chart carefully and recognize, using context, where substitutions/typographical errors may have occurred

## 2022-03-15 ENCOUNTER — OFFICE VISIT (OUTPATIENT)
Dept: OBGYN CLINIC | Facility: OTHER | Age: 37
End: 2022-03-15
Payer: COMMERCIAL

## 2022-03-15 VITALS
HEART RATE: 71 BPM | SYSTOLIC BLOOD PRESSURE: 122 MMHG | BODY MASS INDEX: 29.33 KG/M2 | WEIGHT: 193.5 LBS | HEIGHT: 68 IN | DIASTOLIC BLOOD PRESSURE: 76 MMHG

## 2022-03-15 DIAGNOSIS — S82.61XD CLOSED AVULSION FRACTURE OF LATERAL MALLEOLUS OF RIGHT FIBULA WITH ROUTINE HEALING, SUBSEQUENT ENCOUNTER: Primary | ICD-10-CM

## 2022-03-15 PROCEDURE — 1036F TOBACCO NON-USER: CPT | Performed by: ORTHOPAEDIC SURGERY

## 2022-03-15 PROCEDURE — 3008F BODY MASS INDEX DOCD: CPT | Performed by: ORTHOPAEDIC SURGERY

## 2022-03-15 PROCEDURE — 99213 OFFICE O/P EST LOW 20 MIN: CPT | Performed by: ORTHOPAEDIC SURGERY

## 2022-03-18 ENCOUNTER — OFFICE VISIT (OUTPATIENT)
Dept: PHYSICAL THERAPY | Facility: OTHER | Age: 37
End: 2022-03-18
Payer: COMMERCIAL

## 2022-03-18 DIAGNOSIS — S82.61XD CLOSED AVULSION FRACTURE OF LATERAL MALLEOLUS OF RIGHT FIBULA WITH ROUTINE HEALING, SUBSEQUENT ENCOUNTER: Primary | ICD-10-CM

## 2022-03-18 PROCEDURE — 97112 NEUROMUSCULAR REEDUCATION: CPT

## 2022-03-18 PROCEDURE — 97110 THERAPEUTIC EXERCISES: CPT

## 2022-03-18 PROCEDURE — 97140 MANUAL THERAPY 1/> REGIONS: CPT

## 2022-04-06 NOTE — PROGRESS NOTES
Pt is here for Depo Provera injection  Her last dose was 1/14/2022  Her annual exam was on 8/27/2021  Urine pregnancy test done: N/A   Result: N/A  Depo given in L Buttock  Tolerated well    Next dose due 3 months

## 2022-04-08 ENCOUNTER — CLINICAL SUPPORT (OUTPATIENT)
Dept: OBGYN CLINIC | Facility: CLINIC | Age: 37
End: 2022-04-08
Payer: COMMERCIAL

## 2022-04-08 VITALS — BODY MASS INDEX: 30.2 KG/M2 | SYSTOLIC BLOOD PRESSURE: 120 MMHG | WEIGHT: 198.6 LBS | DIASTOLIC BLOOD PRESSURE: 76 MMHG

## 2022-04-08 DIAGNOSIS — Z30.42 ENCOUNTER FOR DEPO-PROVERA CONTRACEPTION: Primary | ICD-10-CM

## 2022-04-08 PROCEDURE — 96372 THER/PROPH/DIAG INJ SC/IM: CPT | Performed by: PHYSICIAN ASSISTANT

## 2022-04-08 RX ADMIN — MEDROXYPROGESTERONE ACETATE 150 MG: 150 INJECTION, SUSPENSION INTRAMUSCULAR at 07:07

## 2022-04-26 NOTE — PROGRESS NOTES
Patient was progressing well with PT  Self DC  DC from PT at this time   Thank you for the referral

## 2022-07-01 ENCOUNTER — CLINICAL SUPPORT (OUTPATIENT)
Dept: OBGYN CLINIC | Facility: CLINIC | Age: 37
End: 2022-07-01
Payer: COMMERCIAL

## 2022-07-01 VITALS
HEIGHT: 68 IN | BODY MASS INDEX: 30.37 KG/M2 | WEIGHT: 200.4 LBS | SYSTOLIC BLOOD PRESSURE: 110 MMHG | DIASTOLIC BLOOD PRESSURE: 80 MMHG

## 2022-07-01 DIAGNOSIS — Z30.42 ENCOUNTER FOR MANAGEMENT AND INJECTION OF DEPO-PROVERA: Primary | ICD-10-CM

## 2022-07-01 PROCEDURE — 96372 THER/PROPH/DIAG INJ SC/IM: CPT

## 2022-07-01 RX ORDER — MEDROXYPROGESTERONE ACETATE 150 MG/ML
150 INJECTION, SUSPENSION INTRAMUSCULAR ONCE
Status: COMPLETED | OUTPATIENT
Start: 2022-07-01 | End: 2022-07-01

## 2022-07-01 RX ADMIN — MEDROXYPROGESTERONE ACETATE 150 MG: 150 INJECTION, SUSPENSION INTRAMUSCULAR at 10:43

## 2022-07-01 NOTE — PROGRESS NOTES
Depo    Last Depo: 4/8/22  Last Yearly: 8/27/21  Given Within Timeframe?: 6/24-7/8/22  Serum HCG Indicated: No  LMP/Spotting/Bleeding on Depo: Amenorrheic  Injection Site Today: Rt  Buttock  How did patient tolerate:  Well  Literature Given From Inside Box: Yes  Next Depo Time Frame: 12w  Yearly Scheduled?: Yes 10/28/22    Given By: Allie Vasques MA  Lot#: [id number]  EXP: 2026 MAR 31  NDC: 14858-529-48  Manufacture: Chloe Deleon

## 2022-09-19 ENCOUNTER — TELEPHONE (OUTPATIENT)
Dept: OBGYN CLINIC | Facility: CLINIC | Age: 37
End: 2022-09-19

## 2022-09-19 DIAGNOSIS — Z30.42 ENCOUNTER FOR SURVEILLANCE OF INJECTABLE CONTRACEPTIVE: ICD-10-CM

## 2022-09-19 RX ORDER — MEDROXYPROGESTERONE ACETATE 150 MG/ML
INJECTION, SUSPENSION INTRAMUSCULAR
Qty: 1 ML | Refills: 0 | Status: SHIPPED | OUTPATIENT
Start: 2022-09-19 | End: 2022-10-28 | Stop reason: SDUPTHER

## 2022-09-19 NOTE — TELEPHONE ENCOUNTER
pls call pts Depo into pharm as she had to RS today's appt until Wed 9/21 because this was not called in for her appt,  Thanks

## 2022-09-22 ENCOUNTER — OFFICE VISIT (OUTPATIENT)
Dept: OBGYN CLINIC | Facility: CLINIC | Age: 37
End: 2022-09-22
Payer: COMMERCIAL

## 2022-09-22 VITALS — WEIGHT: 208.8 LBS | SYSTOLIC BLOOD PRESSURE: 130 MMHG | BODY MASS INDEX: 31.98 KG/M2 | DIASTOLIC BLOOD PRESSURE: 82 MMHG

## 2022-09-22 DIAGNOSIS — Z30.42 DEPO-PROVERA CONTRACEPTIVE STATUS: Primary | ICD-10-CM

## 2022-09-22 PROCEDURE — 96372 THER/PROPH/DIAG INJ SC/IM: CPT

## 2022-09-22 RX ORDER — MEDROXYPROGESTERONE ACETATE 150 MG/ML
150 INJECTION, SUSPENSION INTRAMUSCULAR ONCE
Status: COMPLETED | OUTPATIENT
Start: 2022-09-22 | End: 2022-09-22

## 2022-09-22 RX ADMIN — MEDROXYPROGESTERONE ACETATE 150 MG: 150 INJECTION, SUSPENSION INTRAMUSCULAR at 11:49

## 2022-09-22 NOTE — PROGRESS NOTES
Pt is here for Depo Provera injection  Her last dose was 7/1/22  Her annual exam was on 8/27/21  Annual scheduled for Oct 28, 2022  Urine pregnancy test done: N   Result: N/A  Depo given in R Buttock  Tolerated well  Lot [id number] Exp 4/2024  Next dose due Dec 5- 19, 2022     Refill needed yes

## 2022-10-28 ENCOUNTER — ANNUAL EXAM (OUTPATIENT)
Dept: OBGYN CLINIC | Facility: CLINIC | Age: 37
End: 2022-10-28

## 2022-10-28 VITALS
BODY MASS INDEX: 31.58 KG/M2 | HEIGHT: 68 IN | DIASTOLIC BLOOD PRESSURE: 82 MMHG | SYSTOLIC BLOOD PRESSURE: 124 MMHG | WEIGHT: 208.4 LBS

## 2022-10-28 DIAGNOSIS — Z01.419 ENCOUNTER FOR GYNECOLOGICAL EXAMINATION (GENERAL) (ROUTINE) WITHOUT ABNORMAL FINDINGS: Primary | ICD-10-CM

## 2022-10-28 DIAGNOSIS — Z30.42 ENCOUNTER FOR SURVEILLANCE OF INJECTABLE CONTRACEPTIVE: ICD-10-CM

## 2022-10-28 RX ORDER — MEDROXYPROGESTERONE ACETATE 150 MG/ML
INJECTION, SUSPENSION INTRAMUSCULAR
Qty: 1 ML | Refills: 3 | Status: SHIPPED | OUTPATIENT
Start: 2022-10-28

## 2022-10-28 NOTE — PROGRESS NOTES
Jaguar Robledo  1985    Assessment and Plan:  Yearly exam without abnormality      -Pap up to date  We reviewed ASCCP guidelines for Pap testing today    -Will continue Depo at this time  Nunu Ibanez will call back if she wishes to change or d/c    RTO one year for yearly exam or sooner as needed  CC:  Yearly exam    S:  40 y o  female here for yearly exam        Amenorrhea with depo  Contraception: Depo  Last Pap: 2021 NILM/HPV-    Non-smoker, social drinker  Exercises irregularly    Doing well  Happy with depo, but considering discontinuation as some of her friends have told her they feel significantly better off contraception   willing to have vasectomy, but she is also very happy with amenorrhea  Sexual activity: She is sexually active without pain, bleeding or dryness  STD testing:  She does not want STD testing today  Family hx of breast cancer: Denies  Family hx of ovarian cancer: Denies  Family hx of colon cancer: Denies    Denies hot flushes, dyspareunia, abnormal uterine bleeding, urinary/fecal incontinence, changes in energy levels, mood         Current Outpatient Medications:   •  acetaminophen (TYLENOL) 325 mg tablet, Take as needed for pain , Disp: 30 tablet, Rfl: 0  •  cetirizine (ZyrTEC) 10 MG chewable tablet, Chew 1 tablet (10 mg total) daily, Disp: 30 tablet, Rfl: 0  •  levocetirizine (XYZAL) 5 MG tablet, Take 5 mg by mouth every evening, Disp: , Rfl:   •  medroxyPROGESTERone acetate (DEPO-PROVERA SYRINGE) 150 mg/mL injection, Inject every 12 weeks, Disp: 1 mL, Rfl: 3  •  multivitamin (THERAGRAN) TABS, Take 1 tablet by mouth daily, Disp: , Rfl:   •  albuterol (Ventolin HFA) 90 mcg/act inhaler, Inhale 2 puffs every 4 (four) hours as needed for wheezing, Disp: 18 g, Rfl: 1    Current Facility-Administered Medications:   •  medroxyPROGESTERone acetate (DEPO-PROVERA SYRINGE) IM injection 150 mg, 150 mg, Intramuscular, Q3 Months, Ronda Kaye PA-C, 150 mg at 22 2226  Social History     Socioeconomic History   • Marital status: /Civil Union     Spouse name: Not on file   • Number of children: Not on file   • Years of education: Not on file   • Highest education level: Not on file   Occupational History   • Not on file   Tobacco Use   • Smoking status: Never Smoker   • Smokeless tobacco: Never Used   Vaping Use   • Vaping Use: Never used   Substance and Sexual Activity   • Alcohol use: Yes     Comment: socially/rare   • Drug use: No   • Sexual activity: Yes     Partners: Male     Birth control/protection: Injection, Coitus interruptus     Comment: depo injection    Other Topics Concern   • Not on file   Social History Narrative    Almost always uses seatbelt     Social Determinants of Health     Financial Resource Strain: Not on file   Food Insecurity: Not on file   Transportation Needs: Not on file   Physical Activity: Not on file   Stress: Not on file   Social Connections: Not on file   Intimate Partner Violence: Not on file   Housing Stability: Not on file     Family History   Problem Relation Age of Onset   • Miscarriages / Stillbirths Mother    • Hyperlipidemia Father    • Cancer Maternal Grandmother    • COPD Paternal Grandfather       Past Medical History:   Diagnosis Date   • Allergic rhinitis     Last assessed 06/02/12   • Asthma     Last assessed 11/24/17   • Cervical polyp     Last assessed 02/21/17   • Disease of thyroid gland     nodule   • Postcoital bleeding    • Varicella     childhood        Review of Systems   Respiratory: Negative  Cardiovascular: Negative  Gastrointestinal: Negative for constipation and diarrhea  Genitourinary: Negative for difficulty urinating, pelvic pain, vaginal bleeding, vaginal discharge, itching or odor  O:  Blood pressure 124/82, height 5' 7 5" (1 715 m), weight 94 5 kg (208 lb 6 4 oz), not currently breastfeeding      Patient appears well and is not in distress  Neck is supple without masses  Breasts are symmetrical without mass, tenderness, nipple discharge, skin changes or adenopathy  Abdomen is soft and nontender without masses  External genitals are normal without lesions or rashes  Clitoral gallegos with irregular raised pigmentation at right aspect   Urethral meatus and urethra are normal  Bladder is normal to palpation  Vagina is normal without discharge or bleeding  Cervix is normal without discharge or lesion  Uterus is normal, mobile, nontender without palpable mass  Adnexa are normal, nontender, without palpable mass

## 2022-11-22 ENCOUNTER — OFFICE VISIT (OUTPATIENT)
Dept: FAMILY MEDICINE CLINIC | Facility: CLINIC | Age: 37
End: 2022-11-22

## 2022-11-22 VITALS
TEMPERATURE: 100.1 F | RESPIRATION RATE: 16 BRPM | OXYGEN SATURATION: 95 % | BODY MASS INDEX: 31.92 KG/M2 | WEIGHT: 210.6 LBS | HEIGHT: 68 IN | HEART RATE: 72 BPM

## 2022-11-22 DIAGNOSIS — J30.89 SEASONAL ALLERGIC RHINITIS DUE TO OTHER ALLERGIC TRIGGER: Primary | ICD-10-CM

## 2022-11-22 PROBLEM — J30.2 SEASONAL ALLERGIC RHINITIS: Status: ACTIVE | Noted: 2022-11-22

## 2022-11-22 NOTE — ASSESSMENT & PLAN NOTE
The patient presents to the office today with a concern of a scratchy and sore throat for approximately 1 week  She states this happens every year at this time  She has been told in the past it is due to postnasal drip  Upon exam there are no abnormal findings  Patient is not taking any medications at this time for her allergic rhinitis  She does not like taking oral anti antihistamines due to the side effects  I did recommend the patient use Flonase nasal spray for the next week or 2 to see if this makes a difference  In addition a cool-mist humidifier in the bedroom at night may also help with the sore throat

## 2022-11-22 NOTE — PROGRESS NOTES
[facility]    NAME: Marielle Rosario  AGE: 40 y o  SEX: female  : 1985     DATE: 2022     Assessment and Plan:     Problem List Items Addressed This Visit        Respiratory    Seasonal allergic rhinitis - Primary     The patient presents to the office today with a concern of a scratchy and sore throat for approximately 1 week  She states this happens every year at this time  She has been told in the past it is due to postnasal drip  Upon exam there are no abnormal findings  Patient is not taking any medications at this time for her allergic rhinitis  She does not like taking oral anti antihistamines due to the side effects  I did recommend the patient use Flonase nasal spray for the next week or 2 to see if this makes a difference  In addition a cool-mist humidifier in the bedroom at night may also help with the sore throat  No follow-ups on file  Chief Complaint:     Chief Complaint   Patient presents with   • Sore Throat     For over a week           History of Present Illness:     Sore throat for one week  Post nasal drip in the past   Not taking allergy  Medications  Exam normal   Flonase recommended  Cool mist humidifier recommended  Review of Systems:     Review of Systems   Constitutional: Negative for activity change, fatigue and fever  HENT: Positive for postnasal drip and sore throat  Negative for congestion, hearing loss, rhinorrhea, trouble swallowing and voice change  Eyes: Negative for photophobia, pain, discharge and visual disturbance  Respiratory: Negative for cough, chest tightness and shortness of breath  Cardiovascular: Negative for chest pain, palpitations and leg swelling  Gastrointestinal: Negative for abdominal pain, blood in stool, constipation, nausea and vomiting  Endocrine: Negative for cold intolerance and heat intolerance     Genitourinary: Negative for difficulty urinating, frequency, hematuria, urgency, vaginal bleeding and vaginal discharge  Musculoskeletal: Negative for arthralgias and myalgias  Skin: Negative  Neurological: Negative for dizziness, weakness, numbness and headaches  Psychiatric/Behavioral: Negative for decreased concentration  The patient is not nervous/anxious  Problem List:     Patient Active Problem List   Diagnosis   • Acute bronchitis with bronchospasm   • H/O acute bronchitis with bronchospasm   • Closed avulsion fracture of lateral malleolus with routine healing   • Seasonal allergic rhinitis        Objective:     Pulse 72   Temp 100 1 °F (37 8 °C) (Tympanic)   Resp 16   Ht 5' 7 5" (1 715 m)   Wt 95 5 kg (210 lb 9 6 oz)   SpO2 95%   BMI 32 50 kg/m²     No current outpatient medications on file  Current Facility-Administered Medications   Medication Dose Route Frequency Provider Last Rate Last Admin   • medroxyPROGESTERone acetate (DEPO-PROVERA SYRINGE) IM injection 150 mg  150 mg Intramuscular Q3 Months Elizabeth Stallion, PA-C   150 mg at 04/08/22 7114       Physical Exam  Vitals reviewed  Constitutional:       Appearance: Normal appearance  She is well-developed  HENT:      Head: Normocephalic  Right Ear: Tympanic membrane and ear canal normal  No drainage, swelling or tenderness  No middle ear effusion  Tympanic membrane is not erythematous  Left Ear: Tympanic membrane and ear canal normal  No drainage, swelling or tenderness  No middle ear effusion  Tympanic membrane is not erythematous  Nose: Nose normal  No congestion or rhinorrhea  Mouth/Throat:      Mouth: Mucous membranes are moist       Pharynx: Oropharynx is clear  Uvula midline  No pharyngeal swelling or posterior oropharyngeal erythema  Tonsils: No tonsillar exudate or tonsillar abscesses  Eyes:      Extraocular Movements: Extraocular movements intact  Pupils: Pupils are equal, round, and reactive to light     Cardiovascular:      Rate and Rhythm: Normal rate and regular rhythm  Heart sounds: Normal heart sounds  Pulmonary:      Effort: Pulmonary effort is normal       Breath sounds: Normal breath sounds  Musculoskeletal:         General: Normal range of motion  Cervical back: Normal range of motion  Skin:     General: Skin is warm and dry  Neurological:      General: No focal deficit present  Mental Status: She is alert and oriented to person, place, and time  Psychiatric:         Mood and Affect: Mood normal          Behavior: Behavior normal          Thought Content:  Thought content normal          Judgment: Judgment normal          Cece Torres, [id number] [address] [facility]

## 2022-12-08 ENCOUNTER — CLINICAL SUPPORT (OUTPATIENT)
Dept: OBGYN CLINIC | Facility: CLINIC | Age: 37
End: 2022-12-08

## 2022-12-08 VITALS — BODY MASS INDEX: 32.53 KG/M2 | WEIGHT: 210.8 LBS | SYSTOLIC BLOOD PRESSURE: 124 MMHG | DIASTOLIC BLOOD PRESSURE: 84 MMHG

## 2022-12-08 DIAGNOSIS — Z30.42 DEPO-PROVERA CONTRACEPTIVE STATUS: ICD-10-CM

## 2022-12-08 DIAGNOSIS — Z30.42 ENCOUNTER FOR DEPO-PROVERA CONTRACEPTION: Primary | ICD-10-CM

## 2022-12-08 RX ORDER — MEDROXYPROGESTERONE ACETATE 150 MG/ML
150 INJECTION, SUSPENSION INTRAMUSCULAR
Status: SHIPPED | OUTPATIENT
Start: 2022-12-08

## 2022-12-08 RX ORDER — MEDROXYPROGESTERONE ACETATE 150 MG/ML
150 INJECTION, SUSPENSION INTRAMUSCULAR ONCE
Status: CANCELLED | OUTPATIENT
Start: 2022-12-08 | End: 2022-12-08

## 2022-12-08 RX ORDER — MEDROXYPROGESTERONE ACETATE 150 MG/ML
INJECTION, SUSPENSION INTRAMUSCULAR
COMMUNITY
Start: 2022-12-06

## 2022-12-08 RX ADMIN — MEDROXYPROGESTERONE ACETATE 150 MG: 150 INJECTION, SUSPENSION INTRAMUSCULAR at 12:41

## 2022-12-08 NOTE — PROGRESS NOTES
Pt is here for Depo Provera injection  Her last dose was 9/22/22  Her annual exam was on 10/28/22  Urine pregnancy test done: N   Result: N/A  Depo given in L Buttock  Tolerated well  Lot 4/30/2026 Exp [id number]  Next dose due Feb 23 - March 9, 2023     Refill needed no

## 2023-03-02 ENCOUNTER — CLINICAL SUPPORT (OUTPATIENT)
Dept: OBGYN CLINIC | Facility: CLINIC | Age: 38
End: 2023-03-02

## 2023-03-02 VITALS — SYSTOLIC BLOOD PRESSURE: 120 MMHG | WEIGHT: 209.6 LBS | DIASTOLIC BLOOD PRESSURE: 86 MMHG | BODY MASS INDEX: 32.34 KG/M2

## 2023-03-02 DIAGNOSIS — Z30.42 DEPO-PROVERA CONTRACEPTIVE STATUS: Primary | ICD-10-CM

## 2023-03-02 RX ORDER — MEDROXYPROGESTERONE ACETATE 150 MG/ML
150 INJECTION, SUSPENSION INTRAMUSCULAR ONCE
Status: COMPLETED | OUTPATIENT
Start: 2023-03-02 | End: 2023-03-02

## 2023-03-02 RX ADMIN — MEDROXYPROGESTERONE ACETATE 150 MG: 150 INJECTION, SUSPENSION INTRAMUSCULAR at 12:49

## 2023-03-02 NOTE — PROGRESS NOTES
Pt is here for Depo Provera injection  Her last dose was 9/22/2022  Her annual exam was on 10/28/2022  Urine pregnancy test N/A  Depo given in R Buttock  Tolerated well    Lot [id number] Exp 9/30/2026  Next dose due 5/18/2023 - 6/1/2023  Refill needed No  Patient was shown package to check R Patient R medication and Exp Date

## 2023-04-22 ENCOUNTER — APPOINTMENT (OUTPATIENT)
Dept: RADIOLOGY | Age: 38
End: 2023-04-22

## 2023-04-22 ENCOUNTER — OFFICE VISIT (OUTPATIENT)
Dept: URGENT CARE | Age: 38
End: 2023-04-22

## 2023-04-22 VITALS
OXYGEN SATURATION: 97 % | RESPIRATION RATE: 12 BRPM | HEART RATE: 78 BPM | DIASTOLIC BLOOD PRESSURE: 78 MMHG | SYSTOLIC BLOOD PRESSURE: 151 MMHG | TEMPERATURE: 97.5 F

## 2023-04-22 DIAGNOSIS — S92.345A CLOSED NONDISPLACED FRACTURE OF FOURTH METATARSAL BONE OF LEFT FOOT, INITIAL ENCOUNTER: ICD-10-CM

## 2023-04-22 DIAGNOSIS — S99.922A FOOT INJURY, LEFT, INITIAL ENCOUNTER: ICD-10-CM

## 2023-04-22 DIAGNOSIS — S99.922A FOOT INJURY, LEFT, INITIAL ENCOUNTER: Primary | ICD-10-CM

## 2023-04-22 NOTE — PATIENT INSTRUCTIONS
X-rays reviewed, suspect minimally displaced fracture of distal fourth metatarsal, awaiting official read  Splint applied, crutches given, nonweightbearing status to left lower lower extremity  Follow-up with orthopedics as directed  Rest, ice, compress and elevate as needed for pain and swelling  Please alternate Tylenol and ibuprofen as needed

## 2023-04-22 NOTE — PROGRESS NOTES
[facility] [facility]        NAME: Casey Rowan is a 40 y o  female  : 1985    MRN: [id number]  DATE: 2023  TIME: 8:15 PM    Assessment and Plan   Foot injury, left, initial encounter [[id number]]  1  Foot injury, left, initial encounter  XR foot 3+ vw left      2  Closed nondisplaced fracture of fourth metatarsal bone of left foot, initial encounter  Ambulatory Referral to Orthopedic Surgery      X-rays reviewed, suspect minimally displaced fracture of distal fourth metatarsal, awaiting official read  Splint applied, crutches given, nonweightbearing status to left lower lower extremity  Follow-up with orthopedics as directed  Rest, ice, compress and elevate as needed for pain and swelling  Please alternate Tylenol and ibuprofen as needed  Patient Instructions     Foot Fracture in Adults   WHAT YOU NEED TO KNOW:   A foot fracture is a break in a bone in your foot         DISCHARGE INSTRUCTIONS:   Call your local emergency number (911 in the [address] [place] [address],[address]) if:   • You suddenly feel lightheaded and short of breath      • You have chest pain when you take a deep breath or cough      • You cough up blood      Return to the emergency department if:   • The pain in your injured foot gets worse even after you rest and take pain medicine      • The skin or toes of your foot become numb, swollen, cold, white, or blue      • You have more pain or swelling than you did before a cast was put on      • Your leg feels warm, tender, and painful  It may look swollen and red      Call your doctor if:   • You have a fever      • You have new sores around your boot, cast, or splint      • You have new or worsening trouble moving your foot      • You notice a foul smell coming from under your cast      • Your boot, cast, or splint gets damaged      • You have questions or concerns about your condition or care      Medicines:   You may need any of the following:  • Antibiotics  help treat or prevent an infection caused by bacteria      • NSAIDs , such as ibuprofen, help decrease swelling, pain, and fever  NSAIDs can cause stomach bleeding or kidney problems in certain people  If you take blood thinner medicine, always ask your healthcare provider if NSAIDs are safe for you  Always read the medicine label and follow directions      • Prescription pain medicine  may be given  Ask your healthcare provider how to take this medicine safely  Some prescription pain medicines contain acetaminophen  Do not take other medicines that contain acetaminophen without talking to your healthcare provider  Too much acetaminophen may cause liver damage  Prescription pain medicine may cause constipation  Ask your healthcare provider how to prevent or treat constipation       • Take your medicine as directed  Contact your healthcare provider if you think your medicine is not helping or if you have side effects  Tell your provider if you are allergic to any medicine  Keep a list of the medicines, vitamins, and herbs you take  Include the amounts, and when and why you take them  Bring the list or the pill bottles to follow-up visits  Carry your medicine list with you in case of an emergency      Self-care:   • Rest  your foot and avoid activities that cause pain      • Apply ice  to decrease swelling and pain, and to prevent tissue damage  Use an ice pack, or put crushed ice in a plastic bag  Cover it with a towel before you apply it  Use ice for 15 to 20 minutes every hour or as directed      • Elevate your foot  above the level of your heart as often as you can  This will help decrease swelling and pain  Prop your foot on pillows or blankets to keep it elevated comfortably           • Physical therapy  may be needed when your foot has healed  A physical therapist can teach you exercises to help improve movement and strength, and to decrease pain      Cast or splint care:   • Ask when it is okay to take a bath or shower   Do not let your cast or splint get wet  Before you bathe, cover the cast or splint with a plastic bag  Tape the bag to your skin above the splint to seal out water  Keep your foot out of the water in case the bag leaks      • Check the skin around your splint daily for any redness or open areas      • Do not use a sharp or pointed object to scratch your skin under the splint      • Do not remove your splint unless your healthcare provider or orthopedic surgeon says it is okay      Assistive devices: You may be given a hard-soled shoe to wear while your foot is healing  You also may need to use crutches to help you walk while your foot heals  It is important to use your crutches correctly  Ask for more information about how to use crutches  Follow up with your doctor or bone specialist as directed: You may need to return to have your splint or stitches removed  You may also need to return for tests to make sure your foot is healing  Write down your questions so you remember to ask them during your visits  © Copyright [facility] 2022 Information is for End User's use only and may not be sold, redistributed or otherwise used for commercial purposes  The above information is an  only  It is not intended as medical advice for individual conditions or treatments  Talk to your doctor, nurse or pharmacist before following any medical regimen to see if it is safe and effective for you             Follow up with PCP in 3-5 days  Proceed to  ER if symptoms worsen  Chief Complaint     Chief Complaint   Patient presents with   • Foot Pain     Lovenia Meeter last night and injured left foot; bruised and swollen this morning         History of Present Illness       Patient is a 71-year-old female with no significant past medical history who presents for evaluation of left foot injury which occurred last night  She reports that she was at a wedding and wearing high-heeled shoes and when she stepped on the cobblestone her foot collapsed and she fell  She reports mild pain and swelling at the base of fourth toe along the distal aspect of the metatarsal bone  She denies numbness, tingling, ecchymosis or ankle pain  She is experiencing pain with weightbearing  Review of Systems   Review of Systems   Constitutional: Negative for fatigue and fever  HENT: Negative for congestion, ear discharge, ear pain, postnasal drip, rhinorrhea, sinus pressure, sinus pain, sneezing and sore throat  Eyes: Negative  Negative for pain, discharge, redness and itching  Respiratory: Negative  Negative for apnea, cough, choking, chest tightness, shortness of breath and stridor  Cardiovascular: Negative  Negative for chest pain and palpitations  Gastrointestinal: Negative  Negative for diarrhea, nausea and vomiting  Endocrine: Negative  Negative for polydipsia, polyphagia and polyuria  Genitourinary: Negative  Negative for decreased urine volume and flank pain  Musculoskeletal: Positive for arthralgias, gait problem and joint swelling  Negative for back pain, myalgias, neck pain and neck stiffness  Skin: Negative  Negative for color change and rash  Allergic/Immunologic: Negative  Negative for environmental allergies  Neurological: Negative for dizziness, facial asymmetry, light-headedness, numbness and headaches  Hematological: Negative  Negative for adenopathy  Psychiatric/Behavioral: Negative            Current Medications       Current Outpatient Medications:   •  medroxyPROGESTERone acetate (DEPO-PROVERA SYRINGE) 150 mg/mL injection, , Disp: , Rfl:     Current Facility-Administered Medications:   •  medroxyPROGESTERone acetate (DEPO-PROVERA SYRINGE) IM injection 150 mg, 150 mg, Intramuscular, Q3 Months, Ronda Kaye PA-C, 150 mg at 04/08/22 0707  •  medroxyPROGESTERone acetate (DEPO-PROVERA SYRINGE) IM injection 150 mg, 150 mg, Intramuscular, Q3 Months, Maren Lux MD, 150 mg at 12/08/22 1241    Current Allergies     Allergies as of 04/22/2023 - Reviewed 04/22/2023   Allergen Reaction Noted   • Pollen extract Sneezing and Nasal Congestion 07/20/2018            The following portions of the patient's history were reviewed and updated as appropriate: allergies, current medications, past family history, past medical history, past social history, past surgical history and problem list      Past Medical History:   Diagnosis Date   • Allergic rhinitis     Last assessed 06/02/12   • Asthma     Last assessed 11/24/17   • Cervical polyp     Last assessed 02/21/17   • Disease of thyroid gland     nodule   • Postcoital bleeding    • Varicella     childhood       Past Surgical History:   Procedure Laterality Date   • COLPOSCOPY      Polyp on cervix   • LASIK     • WISDOM TOOTH EXTRACTION         Family History   Problem Relation Age of Onset   • Miscarriages / Stillbirths Mother    • Hyperlipidemia Father    • Cancer Maternal Grandmother    • COPD Paternal Grandfather          Medications have been verified  Objective   /78 (BP Location: Left arm, Patient Position: Sitting, Cuff Size: Large)   Pulse 78   Temp 97 5 °F (36 4 °C) (Temporal)   Resp 12   SpO2 97%        Physical Exam     Physical Exam  Vitals and nursing note reviewed  Constitutional:       General: She is not in acute distress  Appearance: Normal appearance  She is not ill-appearing, toxic-appearing or diaphoretic  HENT:      Head: Normocephalic and atraumatic  Right Ear: External ear normal       Left Ear: External ear normal       Nose: Nose normal  No congestion or rhinorrhea  Mouth/Throat:      Mouth: Mucous membranes are moist    Eyes:      Extraocular Movements: Extraocular movements intact  Conjunctiva/sclera: Conjunctivae normal       Pupils: Pupils are equal, round, and reactive to light  Cardiovascular:      Rate and Rhythm: Normal rate and regular rhythm  Pulses: Normal pulses  Dorsalis pedis pulses are 2+ on the left side  Heart sounds: Normal heart sounds  No murmur heard  No friction rub  No gallop  Pulmonary:      Effort: Pulmonary effort is normal  No respiratory distress  Breath sounds: Normal breath sounds  No stridor  No wheezing, rhonchi or rales  Abdominal:      General: Bowel sounds are normal       Palpations: Abdomen is soft  Tenderness: There is no abdominal tenderness  There is no guarding or rebound  Musculoskeletal:         General: Normal range of motion  Cervical back: Normal range of motion and neck supple  No tenderness  Left foot: Normal range of motion  No deformity, bunion, Charcot foot, foot drop or prominent metatarsal heads  Feet:    Feet:      Left foot:      Skin integrity: Skin integrity normal       Toenail Condition: Left toenails are normal       Comments: Tenderness and mild swelling/erythema of distal fourth metatarsal at base of left fourth toe  Skin:     General: Skin is warm and dry  Capillary Refill: Capillary refill takes less than 2 seconds  Neurological:      General: No focal deficit present  Mental Status: She is alert and oriented to person, place, and time  Cranial Nerves: No cranial nerve deficit     Psychiatric:         Mood and Affect: Mood normal          Behavior: Behavior normal

## 2023-04-25 ENCOUNTER — OFFICE VISIT (OUTPATIENT)
Dept: OBGYN CLINIC | Facility: OTHER | Age: 38
End: 2023-04-25

## 2023-04-25 VITALS
HEIGHT: 68 IN | BODY MASS INDEX: 31.07 KG/M2 | SYSTOLIC BLOOD PRESSURE: 133 MMHG | DIASTOLIC BLOOD PRESSURE: 82 MMHG | WEIGHT: 205 LBS | HEART RATE: 71 BPM

## 2023-04-25 DIAGNOSIS — S92.345A CLOSED NONDISPLACED FRACTURE OF FOURTH METATARSAL BONE OF LEFT FOOT, INITIAL ENCOUNTER: Primary | ICD-10-CM

## 2023-04-25 NOTE — PROGRESS NOTES
"Assessment:       1  Closed nondisplaced fracture of fourth metatarsal bone of left foot, initial encounter          Plan:        I explained my current clinical findings explained the radiographic findings to the patient today  She has sustained a left foot fourth metatarsal neck nondisplaced stress fracture  Currently there is minimal discomfort on clinical exam   I recommended to wear a hard soled shoe for comfort and avoid lower extremity impact activities until her next clinical review in 4 weeks  At the next review, we will consider gradual transition into her regular footwear  I have advised the patient against wearing high-heeled shoes as this may predispose her to increased forefoot pressure  Patient has expressed understanding and was in agreement with the treatment plan  Subjective:     Patient ID: Mayi Rios is a 40 y o  female  Chief Complaint: Left foot injury    MARGOT Garcia is a 60-year-old lady who presents today for evaluation of some left foot discomfort  Per the patient, she was at a wedding last week wearing high-heeled shoes and had some discomfort of her left foot and a \" near fall\"/loss of balance  However, patient denies any significant pain in the foot or the ankle initially  She did have some discomfort next day in the left foot following which she went to the urgent care center  A plain radiograph of the left foot was performed and this was reported to have no acute osseous injury  However, per the urgent care interpretation the patient had a minimally displaced fracture of the distal fourth metatarsal and was placed in a cam boot  Today, the patient reports no pain with weightbearing and ambulation in the cam boot  Denies any new injury  Denies any proximal midfoot, hindfoot or ankle pain  Denies any significant swelling of the left foot       Social History     Occupational History   • Not on file   Tobacco Use   • Smoking status: Never   • Smokeless " tobacco: Never   Vaping Use   • Vaping Use: Never used   Substance and Sexual Activity   • Alcohol use: Yes     Comment: socially/rare   • Drug use: No   • Sexual activity: Yes     Partners: Male     Birth control/protection: Injection, Coitus interruptus     Comment: depo injection       Review of Systems        Objective:     Ortho ExamPhysical Exam  Vitals and nursing note reviewed  Constitutional:       Appearance: She is well-developed  HENT:      Head: Normocephalic and atraumatic  Eyes:      Conjunctiva/sclera: Conjunctivae normal       Pupils: Pupils are equal, round, and reactive to light  Cardiovascular:      Rate and Rhythm: Normal rate  Pulmonary:      Effort: Pulmonary effort is normal  No respiratory distress  Skin:     General: Skin is warm and dry  Findings: No erythema  Neurological:      Mental Status: She is alert and oriented to person, place, and time  Cranial Nerves: No cranial nerve deficit  Psychiatric:         Behavior: Behavior normal          Thought Content: Thought content normal          Judgment: Judgment normal          Left foot exam:  No swelling or deformity  Only mild discomfort to palpation over the fourth metatarsal head/neck region  Negative metatarsal squeeze  Patient is able to fully weight-bear on her left lower extremity with only minimal discomfort on tiptoe weightbearing  Denies any significant discomfort with passive midfoot motion or any ankle movement  Clinically intact distal neurovascular status  I have personally reviewed pertinent films in PACS and my interpretation is Plain radiograph of the left foot from 4/22/2023 was reviewed  This reveals a nondisplaced fourth metatarsal neck fracture  Due to some sclerosis, this appears to be a stress fracture  Taryn Prim

## 2023-05-19 ENCOUNTER — CLINICAL SUPPORT (OUTPATIENT)
Dept: OBGYN CLINIC | Facility: CLINIC | Age: 38
End: 2023-05-19

## 2023-05-19 VITALS — BODY MASS INDEX: 32.25 KG/M2 | WEIGHT: 209 LBS | DIASTOLIC BLOOD PRESSURE: 82 MMHG | SYSTOLIC BLOOD PRESSURE: 138 MMHG

## 2023-05-19 DIAGNOSIS — Z30.42 ENCOUNTER FOR DEPO-PROVERA CONTRACEPTION: Primary | ICD-10-CM

## 2023-05-19 DIAGNOSIS — Z30.42 DEPO-PROVERA CONTRACEPTIVE STATUS: ICD-10-CM

## 2023-05-19 RX ORDER — MEDROXYPROGESTERONE ACETATE 150 MG/ML
150 INJECTION, SUSPENSION INTRAMUSCULAR ONCE
Status: COMPLETED | OUTPATIENT
Start: 2023-05-19 | End: 2023-05-19

## 2023-05-19 RX ORDER — MULTIVITAMIN
1 CAPSULE ORAL DAILY
COMMUNITY

## 2023-05-19 RX ADMIN — MEDROXYPROGESTERONE ACETATE 150 MG: 150 INJECTION, SUSPENSION INTRAMUSCULAR at 10:01

## 2023-05-19 NOTE — PROGRESS NOTES
Pt here for depo injection  Last injection 3/2/23  Last annual 10/2022  Administered in right glute  Lot number [id number]  Exp 04/30/27  Next injection due 8/11/23  Patients  will be going for a vasectomy so will be stopping depo

## 2023-05-26 ENCOUNTER — OFFICE VISIT (OUTPATIENT)
Dept: OBGYN CLINIC | Facility: OTHER | Age: 38
End: 2023-05-26

## 2023-05-26 ENCOUNTER — APPOINTMENT (OUTPATIENT)
Dept: RADIOLOGY | Facility: OTHER | Age: 38
End: 2023-05-26

## 2023-05-26 VITALS — BODY MASS INDEX: 32.25 KG/M2 | HEIGHT: 68 IN

## 2023-05-26 DIAGNOSIS — S92.345D CLOSED NONDISPLACED FRACTURE OF FOURTH METATARSAL BONE OF LEFT FOOT WITH ROUTINE HEALING, SUBSEQUENT ENCOUNTER: Primary | ICD-10-CM

## 2023-05-26 DIAGNOSIS — S92.345D CLOSED NONDISPLACED FRACTURE OF FOURTH METATARSAL BONE OF LEFT FOOT WITH ROUTINE HEALING, SUBSEQUENT ENCOUNTER: ICD-10-CM

## 2023-05-26 NOTE — PROGRESS NOTES
Assessment:       1  Closed nondisplaced fracture of fourth metatarsal bone of left foot with routine healing, subsequent encounter          Plan:        Explained my current clinical findings and reviewed the radiological findings with the patient today  She has a healing left foot fourth metatarsal neck fracture  Clinically, she does not have any tenderness of the affected area but notices some discomfort with prolonged ambulation  Hence, I have recommended her to avoid doing any high impact activity, running or jumping, kicking type activities at this time  I recommend her to wear a hard soled shoe and will follow-up after 6 weeks for clinical and radiological reassessment  Consider discharge from clinical care at the next visit if there is continued healing  Subjective:     Patient ID: Gloria Ng is a 40 y o  female  Chief Complaint:    HPI  Paulina Marvin is here today for a follow-up for nondisplaced left foot fourth metatarsal neck fracture sustained approximately 6 weeks ago  At her last office visit on 4/25/2023, she was recommended to wear a hard soled shoe  However, she only wore the shoe for approximately 3 weeks as it was altering her gait  Thereafter, she has been wearing her regular footwear  Notices some discomfort of the left foot with prolonged ambulation  Denies any new injury  Social History     Occupational History   • Not on file   Tobacco Use   • Smoking status: Never   • Smokeless tobacco: Never   Vaping Use   • Vaping Use: Never used   Substance and Sexual Activity   • Alcohol use: Yes     Comment: socially/rare   • Drug use: No   • Sexual activity: Yes     Partners: Male     Birth control/protection: Injection, Coitus interruptus     Comment: depo injection       Review of Systems        Objective:     Ortho ExamPhysical Exam  Vitals and nursing note reviewed  Constitutional:       Appearance: She is well-developed  HENT:      Head: Normocephalic and atraumatic  Eyes:      Conjunctiva/sclera: Conjunctivae normal       Pupils: Pupils are equal, round, and reactive to light  Cardiovascular:      Rate and Rhythm: Normal rate and regular rhythm  Pulmonary:      Effort: Pulmonary effort is normal  No respiratory distress  Skin:     General: Skin is warm and dry  Findings: No erythema  Neurological:      Mental Status: She is alert and oriented to person, place, and time  Cranial Nerves: No cranial nerve deficit  Psychiatric:         Behavior: Behavior normal          Thought Content: Thought content normal          Judgment: Judgment normal          Left foot exam:  No swelling or deformity  No significant tenderness on passive metatarsal squeeze  No significant discomfort with palpation of the fourth metatarsal head  Clinically intact distal neurovascular status  I have personally reviewed pertinent films in PACS and my interpretation is Plain radiograph of the left foot performed today reveals a healing nondisplaced fourth metatarsal neck fracture  Mary Chou

## 2023-05-26 NOTE — PATIENT INSTRUCTIONS
No barefoot walking or running  Wear hard sole shoe  AVOID HIGH IMPACT ACTIVITY on left foot  F/u in 6 weeks

## 2023-05-30 ENCOUNTER — OFFICE VISIT (OUTPATIENT)
Dept: URGENT CARE | Facility: CLINIC | Age: 38
End: 2023-05-30

## 2023-05-30 VITALS
OXYGEN SATURATION: 97 % | SYSTOLIC BLOOD PRESSURE: 133 MMHG | HEART RATE: 76 BPM | RESPIRATION RATE: 16 BRPM | DIASTOLIC BLOOD PRESSURE: 84 MMHG | TEMPERATURE: 98.7 F

## 2023-05-30 DIAGNOSIS — J40 BRONCHITIS: Primary | ICD-10-CM

## 2023-05-30 RX ORDER — ALBUTEROL SULFATE 90 UG/1
2 AEROSOL, METERED RESPIRATORY (INHALATION) EVERY 6 HOURS PRN
Qty: 8.5 G | Refills: 0 | Status: SHIPPED | OUTPATIENT
Start: 2023-05-30

## 2023-05-30 RX ORDER — BENZONATATE 100 MG/1
200 CAPSULE ORAL 3 TIMES DAILY PRN
Qty: 20 CAPSULE | Refills: 0 | Status: SHIPPED | OUTPATIENT
Start: 2023-05-30

## 2023-05-30 NOTE — PROGRESS NOTES
[facility]        NAME: Anson Uribe is a 40 y o  female  : 1985    MRN: [id number]  DATE: May 30, 2023  TIME: 12:04 PM    Assessment and Plan   Bronchitis [J40]  1  Bronchitis  albuterol (ProAir HFA) 90 mcg/act inhaler    benzonatate (TESSALON PERLES) 100 mg capsule        Acute symptomatic associated with cough, chest tightness and wheezing  Denies sob/chest pain, fever, nc, rhinorrhea, N/V/D  Will recommend increase fluids, start tessalon pearls TID prn for cough and albuterol inhaler 2 puffs q 6 hours as needed for wheezing  Educated on s/e and proper use f/u with pcp with worsening of symptoms or no improvement  Patient Instructions       Follow up with PCP in 3-5 days  Proceed to  ER if symptoms worsen  Chief Complaint     Chief Complaint   Patient presents with   • Cough     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           History of Present Illness       Patient arrives with c/o cough since last Wednesday following inhalation of a lot of dust when cleaning out a room  Denies sob, chest pain, nc, rhinorrhea, fever  Associated with wheezing  Has not tried anything w/o relief  Review of Systems   Review of Systems   Constitutional: Negative for activity change, appetite change, chills, fatigue and fever  HENT: Negative for congestion, postnasal drip, rhinorrhea, sneezing and sore throat  Respiratory: Positive for cough, chest tightness and wheezing  Negative for shortness of breath  Cardiovascular: Negative for chest pain and palpitations  Gastrointestinal: Negative for abdominal pain, constipation, diarrhea, nausea and vomiting  Musculoskeletal: Negative for arthralgias and myalgias  Skin: Negative for color change, pallor and rash  Neurological: Negative for dizziness, weakness, light-headedness and headaches  Hematological: Negative for adenopathy  Psychiatric/Behavioral: Negative for agitation and confusion  Current Medications       Current Outpatient Medications:   •  albuterol (ProAir HFA) 90 mcg/act inhaler, Inhale 2 puffs every 6 (six) hours as needed for wheezing or shortness of breath, Disp: 8 5 g, Rfl: 0  •  benzonatate (TESSALON PERLES) 100 mg capsule, Take 2 capsules (200 mg total) by mouth 3 (three) times a day as needed for cough, Disp: 20 capsule, Rfl: 0  •  Calcium-Phosphorus-Vitamin D (VITAMIN D3/CALCIUM/PHOSPHORUS PO), Take by mouth, Disp: , Rfl:   •  medroxyPROGESTERone acetate (DEPO-PROVERA SYRINGE) 150 mg/mL injection, , Disp: , Rfl:   •  Multiple Vitamin (multivitamin) capsule, Take 1 capsule by mouth daily, Disp: , Rfl:     Current Facility-Administered Medications:   •  medroxyPROGESTERone acetate (DEPO-PROVERA SYRINGE) IM injection 150 mg, 150 mg, Intramuscular, Q3 Months, Ronda Kaye PA-C, 150 mg at 04/08/22 0707  •  medroxyPROGESTERone acetate (DEPO-PROVERA SYRINGE) IM injection 150 mg, 150 mg, Intramuscular, Q3 Months, Ross Hansen MD, 150 mg at 12/08/22 1241    Current Allergies     Allergies as of 05/30/2023 - Reviewed 05/30/2023   Allergen Reaction Noted   • Pollen extract Sneezing and Nasal Congestion 07/20/2018            The following portions of the patient's history were reviewed and updated as appropriate: allergies, current medications, past family history, past medical history, past social history, past surgical history and problem list      Past Medical History:   Diagnosis Date   • Allergic rhinitis     Last assessed 06/02/12   • Asthma     Last assessed 11/24/17   • Cervical polyp     Last assessed 02/21/17   • Disease of thyroid gland     nodule   • Postcoital bleeding    • Varicella     childhood       Past Surgical History:   Procedure Laterality Date   • COLPOSCOPY      Polyp on cervix   • LASIK     • WISDOM TOOTH EXTRACTION         Family History   Problem Relation Age of Onset   • Miscarriages / Stillbirths Mother    • Hyperlipidemia Father    • Cancer Maternal Grandmother    • COPD Paternal Grandfather          Medications have been verified  Objective   /84   Pulse 76   Temp 98 7 °F (37 1 °C)   Resp 16   SpO2 97%   No LMP recorded  (Menstrual status: Birth Control)  Physical Exam     Physical Exam  Vitals and nursing note reviewed  Constitutional:       General: She is not in acute distress  Appearance: Normal appearance  She is not ill-appearing or diaphoretic  HENT:      Head: Normocephalic and atraumatic  Right Ear: Tympanic membrane, ear canal and external ear normal  There is no impacted cerumen  Left Ear: Tympanic membrane, ear canal and external ear normal  There is no impacted cerumen  Nose: No congestion or rhinorrhea  Right Sinus: No maxillary sinus tenderness or frontal sinus tenderness  Left Sinus: No maxillary sinus tenderness or frontal sinus tenderness  Mouth/Throat:      Mouth: Mucous membranes are moist       Pharynx: Oropharynx is clear  No oropharyngeal exudate or posterior oropharyngeal erythema  Eyes:      General: No scleral icterus  Right eye: No discharge  Left eye: No discharge  Conjunctiva/sclera: Conjunctivae normal    Cardiovascular:      Rate and Rhythm: Normal rate and regular rhythm  Pulmonary:      Effort: Pulmonary effort is normal  No respiratory distress  Breath sounds: No stridor  Wheezing (bilat bases) present  No rhonchi or rales  Musculoskeletal:         General: Normal range of motion  Cervical back: Normal range of motion  Lymphadenopathy:      Cervical: No cervical adenopathy  Skin:     Coloration: Skin is not jaundiced or pale  Findings: No bruising, erythema or rash  Neurological:      General: No focal deficit present  Mental Status: She is alert and oriented to person, place, and time  Psychiatric:         Mood and Affect: Mood normal          Behavior: Behavior normal          Thought Content:  Thought content normal          Judgment: Judgment normal

## 2023-06-26 ENCOUNTER — OFFICE VISIT (OUTPATIENT)
Dept: OBGYN CLINIC | Facility: OTHER | Age: 38
End: 2023-06-26
Payer: COMMERCIAL

## 2023-06-26 ENCOUNTER — APPOINTMENT (OUTPATIENT)
Dept: RADIOLOGY | Facility: OTHER | Age: 38
End: 2023-06-26
Payer: COMMERCIAL

## 2023-06-26 VITALS
HEIGHT: 68 IN | BODY MASS INDEX: 32.43 KG/M2 | DIASTOLIC BLOOD PRESSURE: 84 MMHG | HEART RATE: 70 BPM | WEIGHT: 214 LBS | SYSTOLIC BLOOD PRESSURE: 134 MMHG

## 2023-06-26 DIAGNOSIS — S92.345D CLOSED NONDISPLACED FRACTURE OF FOURTH METATARSAL BONE OF LEFT FOOT WITH ROUTINE HEALING, SUBSEQUENT ENCOUNTER: Primary | ICD-10-CM

## 2023-06-26 DIAGNOSIS — S92.345D CLOSED NONDISPLACED FRACTURE OF FOURTH METATARSAL BONE OF LEFT FOOT WITH ROUTINE HEALING, SUBSEQUENT ENCOUNTER: ICD-10-CM

## 2023-06-26 PROCEDURE — 99213 OFFICE O/P EST LOW 20 MIN: CPT | Performed by: ORTHOPAEDIC SURGERY

## 2023-06-26 PROCEDURE — 73630 X-RAY EXAM OF FOOT: CPT

## 2023-06-26 NOTE — PROGRESS NOTES
Assessment:       1  Closed nondisplaced fracture of fourth metatarsal bone of left foot with routine healing, subsequent encounter          Plan:        Explained my current clinical findings and reviewed the radiological findings with the patient today  She has good progressive radiological healing and has had clinical healing of the left foot fourth metatarsal neck fracture  At this time she may gradually progress to all physical activities as tolerated  I will be happy to see her in the future if there are any further concerns in this regard  Subjective:     Patient ID: Nyasia Magallon is a 40 y o  female  Chief Complaint:    HPI Leesa Holstein presents today for follow-up of her left foot fourth metatarsal neck nondisplaced fracture  Since her last office visit on 5/26/2023, patient has remained full weightbearing in her regular shoes and currently denies any further pain of the left foot  No reported new injuries  She is now over 2 months since her left foot injury  Social History     Occupational History   • Not on file   Tobacco Use   • Smoking status: Never   • Smokeless tobacco: Never   Vaping Use   • Vaping Use: Never used   Substance and Sexual Activity   • Alcohol use: Yes     Comment: socially/rare   • Drug use: No   • Sexual activity: Yes     Partners: Male     Birth control/protection: Injection, Coitus interruptus     Comment: depo injection       Review of Systems        Objective:     Ortho ExamPhysical Exam  Vitals and nursing note reviewed  Constitutional:       Appearance: She is well-developed  HENT:      Head: Normocephalic and atraumatic  Eyes:      Conjunctiva/sclera: Conjunctivae normal       Pupils: Pupils are equal, round, and reactive to light  Cardiovascular:      Rate and Rhythm: Normal rate  Pulmonary:      Effort: Pulmonary effort is normal  No respiratory distress  Skin:     General: Skin is warm and dry  Findings: No erythema     Neurological: Mental Status: She is alert and oriented to person, place, and time  Cranial Nerves: No cranial nerve deficit  Psychiatric:         Behavior: Behavior normal          Thought Content: Thought content normal          Judgment: Judgment normal          Left foot exam:  No swelling or deformity  There is no tenderness to palpation over the fourth metatarsal shaft or neck  No discomfort with metatarsal squeeze  Patient is able to to do left foot single-leg tiptoe weightbearing without any pain  Clinically intact distal neurovascular status  I have personally reviewed pertinent films in PACS and my interpretation is Plain radiograph of the left foot performed today reveals progressive healing of nondisplaced fourth metatarsal neck fracture  Zaheer Arias

## 2023-07-20 NOTE — PROGRESS NOTES
Pt here for Depo injection  Given in right buttocks  Pt tolerated well    Josefa Bull [address] [address]-7  Lot #[id number]  Exp 8/31/22
Yes

## 2023-10-30 ENCOUNTER — ANNUAL EXAM (OUTPATIENT)
Dept: OBGYN CLINIC | Facility: CLINIC | Age: 38
End: 2023-10-30
Payer: COMMERCIAL

## 2023-10-30 VITALS
HEIGHT: 68 IN | DIASTOLIC BLOOD PRESSURE: 80 MMHG | SYSTOLIC BLOOD PRESSURE: 148 MMHG | BODY MASS INDEX: 33.37 KG/M2 | WEIGHT: 220.2 LBS

## 2023-10-30 DIAGNOSIS — Z01.419 ROUTINE GYNECOLOGICAL EXAMINATION: Primary | ICD-10-CM

## 2023-10-30 DIAGNOSIS — R53.83 FATIGUE, UNSPECIFIED TYPE: ICD-10-CM

## 2023-10-30 DIAGNOSIS — N91.1 SECONDARY AMENORRHEA: ICD-10-CM

## 2023-10-30 PROCEDURE — S0612 ANNUAL GYNECOLOGICAL EXAMINA: HCPCS | Performed by: PHYSICIAN ASSISTANT

## 2023-10-30 NOTE — PROGRESS NOTES
Assessment   41 y.o. Renee Jones presenting for annual exam.     Plan   Diagnoses and all orders for this visit:    Routine gynecological examination  Normal findings on routine exam.  Encouraged 150 min of exercise per week. Reviewed balanced diet. Multivitamin encouraged   Breast awareness/SBE encouraged     Fatigue, unspecified type  -     TSH, 3rd generation with Free T4 reflex; Future  -     Vitamin D 25 hydroxy; Future  -     CBC and differential; Future  -     Comprehensive metabolic panel; Future    As below. Secondary amenorrhea  Reviewed mechanism of action of depo provera. Reviewed that effects can be seen for up to 18 months following last injection, though menses often return sooner. Discussed common side effects of depo to include weight changes, mood changes, and libido changes. Discussed effect on bone mineral density. She is taking an additional vit d and calcium. We reviewed limitations of hormone testing following depo, and how this does not provide timeframe on return of ovarian function. Given c/o fatigue and lack of routine lab work in last few years, will check TSH, CBC, CMP, and Vit D level to evaluate fatigue. Declines HCG testing as she is monogamous with  and he had reported negative semen analysis prior to d/c depo. Will notify with results of lab work. Encouraged continued efforts at healthy lifestyle. Pap - due 2026  Mammo due @ 41    RTO one year for yearly exam or sooner as needed. __________________________________________________________________    Subjective     Marley Romero is a 41 y.o. Renee Jones presenting for annual exam.     SCREENING  Last Pap: 08/27/2021 NILM/hpv neg   Last Mammo: n/a  Last Colonoscopy:n/a     GYN    Periods: amenorrheic on depo - last injection 5/19/2023. Has been amenorrheic since and is requesting labs due c/o fatigue, decreased libido, and inability to lose weight. Was on depo x 7 years after having son in 2017.  Also had 2 fractures in 13 months and now wondering if all these side effects could be due to depo. Sexually active: Yes - single partner -    Concerns: denies pain, bleeding, dryness   Contraception: partner's vasectomy    Hx Abnormal pap: denies abn pap but did have colposcopy with benign polyp removed. We reviewed ASCCP guidelines for Pap testing today. Gardasil: She has completed the Gardasil series. Denies vaginal discharge, itching, odor, dyspareunia, pelvic pain and vulvar/vaginal symptoms    OB         Complaints: denies   Denies urgency, frequency, hematuria, leakage / change in stream, difficulty urinating. BREAST  Complaints: denies   Denies: breast lump, breast tenderness, nipple discharge, skin color change, and skin lesion(s)    Pertinent Family Hx:   Family hx of breast cancer: Mother (dx in late 62s)   Family hx of ovarian cancer: no  Family hx of colon cancer: no      GENERAL  PMH reviewed/updated and is as below. Past Medical History:   Diagnosis Date    Allergic rhinitis     Last assessed 12    Asthma     Last assessed 17    Cervical polyp     Last assessed 17    Disease of thyroid gland     nodule    Postcoital bleeding     Varicella     childhood       Past Surgical History:   Procedure Laterality Date    COLPOSCOPY      Polyp on cervix    LASIK      WISDOM TOOTH EXTRACTION           Current Outpatient Medications:     Calcium-Phosphorus-Vitamin D (VITAMIN D3/CALCIUM/PHOSPHORUS PO), Take by mouth, Disp: , Rfl:     Multiple Vitamin (multivitamin) capsule, Take 1 capsule by mouth daily, Disp: , Rfl:     albuterol (ProAir HFA) 90 mcg/act inhaler, Inhale 2 puffs every 6 (six) hours as needed for wheezing or shortness of breath, Disp: 8.5 g, Rfl: 0  No current facility-administered medications for this visit.     Allergies   Allergen Reactions    Pollen Extract Sneezing and Nasal Congestion       Social History     Socioeconomic History    Marital status: /Civil Union Spouse name: Not on file    Number of children: Not on file    Years of education: Not on file    Highest education level: Not on file   Occupational History    Not on file   Tobacco Use    Smoking status: Never    Smokeless tobacco: Never   Vaping Use    Vaping Use: Never used   Substance and Sexual Activity    Alcohol use: Yes     Alcohol/week: 1.0 standard drink of alcohol     Types: 1 Cans of beer per week     Comment: Socially    Drug use: No    Sexual activity: Yes     Partners: Male     Birth control/protection: Male Sterilization   Other Topics Concern    Not on file   Social History Narrative    Almost always uses seatbelt     Social Determinants of Health     Financial Resource Strain: Not on file   Food Insecurity: Not on file   Transportation Needs: Not on file   Physical Activity: Not on file   Stress: Not on file   Social Connections: Not on file   Intimate Partner Violence: Not on file   Housing Stability: Not on file       Review of Systems     Constitutional: Negative. Respiratory: Negative. Cardiovascular: Negative   Gastrointestinal: Negative   Breasts: As noted above. Genitourinary: As noted above. Psychiatric: Negative     Objective      /80 (BP Location: Right arm, Patient Position: Sitting, Cuff Size: Large)   Ht 5' 8" (1.727 m)   Wt 99.9 kg (220 lb 3.2 oz)   LMP  (LMP Unknown)   BMI 33.48 kg/m²     Physical Examination:    Patient appears well and is not in distress  Breasts are symmetrical without mass, tenderness, nipple discharge, skin changes or adenopathy. Abdomen is soft and nontender without masses. External genitals are normal without lesions or rashes. Urethral meatus and urethra are normal  Bladder is normal to palpation  Vagina is normal without discharge or bleeding. Cervix is normal without discharge or lesion. Uterus is normal, mobile, nontender without palpable mass. Adnexa are normal, nontender, without palpable mass.

## 2023-11-02 ENCOUNTER — APPOINTMENT (OUTPATIENT)
Dept: LAB | Facility: CLINIC | Age: 38
End: 2023-11-02
Payer: COMMERCIAL

## 2023-11-02 DIAGNOSIS — R53.83 FATIGUE, UNSPECIFIED TYPE: ICD-10-CM

## 2023-11-02 LAB
25(OH)D3 SERPL-MCNC: 63.4 NG/ML (ref 30–100)
ALBUMIN SERPL BCP-MCNC: 4.2 G/DL (ref 3.5–5)
ALP SERPL-CCNC: 71 U/L (ref 34–104)
ALT SERPL W P-5'-P-CCNC: 33 U/L (ref 7–52)
ANION GAP SERPL CALCULATED.3IONS-SCNC: 5 MMOL/L
AST SERPL W P-5'-P-CCNC: 27 U/L (ref 13–39)
BASOPHILS # BLD AUTO: 0.05 THOUSANDS/ÂΜL (ref 0–0.1)
BASOPHILS NFR BLD AUTO: 1 % (ref 0–1)
BILIRUB SERPL-MCNC: 0.63 MG/DL (ref 0.2–1)
BUN SERPL-MCNC: 15 MG/DL (ref 5–25)
CALCIUM SERPL-MCNC: 8.8 MG/DL (ref 8.4–10.2)
CHLORIDE SERPL-SCNC: 105 MMOL/L (ref 96–108)
CO2 SERPL-SCNC: 28 MMOL/L (ref 21–32)
CREAT SERPL-MCNC: 0.98 MG/DL (ref 0.6–1.3)
EOSINOPHIL # BLD AUTO: 0.17 THOUSAND/ÂΜL (ref 0–0.61)
EOSINOPHIL NFR BLD AUTO: 3 % (ref 0–6)
ERYTHROCYTE [DISTWIDTH] IN BLOOD BY AUTOMATED COUNT: 12.7 % (ref 11.6–15.1)
GFR SERPL CREATININE-BSD FRML MDRD: 73 ML/MIN/1.73SQ M
GLUCOSE P FAST SERPL-MCNC: 86 MG/DL (ref 65–99)
HCT VFR BLD AUTO: 45 % (ref 34.8–46.1)
HGB BLD-MCNC: 15.3 G/DL (ref 11.5–15.4)
IMM GRANULOCYTES # BLD AUTO: 0.02 THOUSAND/UL (ref 0–0.2)
IMM GRANULOCYTES NFR BLD AUTO: 0 % (ref 0–2)
LYMPHOCYTES # BLD AUTO: 1.33 THOUSANDS/ÂΜL (ref 0.6–4.47)
LYMPHOCYTES NFR BLD AUTO: 20 % (ref 14–44)
MCH RBC QN AUTO: 29.3 PG (ref 26.8–34.3)
MCHC RBC AUTO-ENTMCNC: 34 G/DL (ref 31.4–37.4)
MCV RBC AUTO: 86 FL (ref 82–98)
MONOCYTES # BLD AUTO: 0.54 THOUSAND/ÂΜL (ref 0.17–1.22)
MONOCYTES NFR BLD AUTO: 8 % (ref 4–12)
NEUTROPHILS # BLD AUTO: 4.61 THOUSANDS/ÂΜL (ref 1.85–7.62)
NEUTS SEG NFR BLD AUTO: 68 % (ref 43–75)
NRBC BLD AUTO-RTO: 0 /100 WBCS
PLATELET # BLD AUTO: 259 THOUSANDS/UL (ref 149–390)
PMV BLD AUTO: 10.7 FL (ref 8.9–12.7)
POTASSIUM SERPL-SCNC: 4.1 MMOL/L (ref 3.5–5.3)
PROT SERPL-MCNC: 7.2 G/DL (ref 6.4–8.4)
RBC # BLD AUTO: 5.22 MILLION/UL (ref 3.81–5.12)
SODIUM SERPL-SCNC: 138 MMOL/L (ref 135–147)
TSH SERPL DL<=0.05 MIU/L-ACNC: 3.55 UIU/ML (ref 0.45–4.5)
WBC # BLD AUTO: 6.72 THOUSAND/UL (ref 4.31–10.16)

## 2023-11-02 PROCEDURE — 84443 ASSAY THYROID STIM HORMONE: CPT

## 2023-11-02 PROCEDURE — 85025 COMPLETE CBC W/AUTO DIFF WBC: CPT

## 2023-11-02 PROCEDURE — 36415 COLL VENOUS BLD VENIPUNCTURE: CPT

## 2023-11-02 PROCEDURE — 80053 COMPREHEN METABOLIC PANEL: CPT

## 2023-11-02 PROCEDURE — 82306 VITAMIN D 25 HYDROXY: CPT

## 2024-09-10 ENCOUNTER — TELEPHONE (OUTPATIENT)
Dept: FAMILY MEDICINE CLINIC | Facility: CLINIC | Age: 39
End: 2024-09-10

## 2024-09-12 NOTE — TELEPHONE ENCOUNTER
09/12/24 10:40 AM        The office's request has been received, reviewed, and the patient chart updated. The PCP has successfully been removed with a patient attribution note. This message will now be completed.        Thank you  Lennie Ellis

## 2024-11-12 ENCOUNTER — ANNUAL EXAM (OUTPATIENT)
Dept: OBGYN CLINIC | Facility: CLINIC | Age: 39
End: 2024-11-12
Payer: COMMERCIAL

## 2024-11-12 VITALS
BODY MASS INDEX: 31.4 KG/M2 | WEIGHT: 212 LBS | HEIGHT: 69 IN | DIASTOLIC BLOOD PRESSURE: 90 MMHG | SYSTOLIC BLOOD PRESSURE: 124 MMHG

## 2024-11-12 DIAGNOSIS — Z01.419 ENCOUNTER FOR ANNUAL ROUTINE GYNECOLOGICAL EXAMINATION: Primary | ICD-10-CM

## 2024-11-12 PROCEDURE — S0612 ANNUAL GYNECOLOGICAL EXAMINA: HCPCS | Performed by: OBSTETRICS & GYNECOLOGY

## 2024-11-12 RX ORDER — CLINDAMYCIN PHOSPHATE 10 UG/ML
LOTION TOPICAL AS NEEDED
COMMUNITY
Start: 2024-10-07

## 2024-11-12 RX ORDER — PROGESTERONE 200 MG/1
1 CAPSULE ORAL EVERY MORNING
COMMUNITY
Start: 2024-10-30

## 2024-11-12 RX ORDER — TESTOSTERONE CYPIONATE 1000 MG/10ML
0.05 INJECTION, SOLUTION INTRAMUSCULAR ONCE
COMMUNITY

## 2024-11-12 RX ORDER — LIOTHYRONINE SODIUM 5 UG/1
5 TABLET ORAL DAILY
COMMUNITY
Start: 2024-11-08

## 2024-11-12 NOTE — PROGRESS NOTES
"Ambulatory Visit  Name: Annie Lazar      : 1985      MRN: [id number]  Encounter Provider: Trini Zelaya MD  Encounter Date: 2024   Encounter department: [facility] [place]    Assessment & Plan  Encounter for annual routine gynecological examination  Pap up-to-date  Gardasil series complete  Declines STD testing  Reviewed family history and recommend mammogram at age 41  We also reviewed her testosterone injections along with progesterone therapy.  I let her know that this is not FDA approved and that there can be side effects.  She is currently happy with the management by her other physician.  She plans to continue this regimen         History of Present Illness     Annie Lazar is a 39 y.o. female who presents for Yearly exam   pap: 2021 NILM HPV neg   Gardasil series completed.   mammogram: Due at 41   Reviewed Fh of breast cancer in her mother and GM (mother > age 60 and had negative genetic testing). I did review option for [employer] DNA testing  colonoscopy: Due at 46   LMP: 2024. Periods are regular and last 4 days. Taking IM testosterone and PO progesterone for hormone help.  BC: Partner's vasectomy   Currently sexually active.   Denies pain during intercourse.   Declines STD testing       Review of Systems        Objective     /90 (BP Location: Left arm, Patient Position: Sitting, Cuff Size: Standard)   Ht 5' 8.5\" (1.74 m)   Wt 96.2 kg (212 lb)   LMP 2024 (Exact Date)   BMI 31.76 kg/m²     Physical Exam  Vitals and nursing note reviewed.   Constitutional:       General: She is not in acute distress.     Appearance: She is not ill-appearing.   Pulmonary:      Effort: Pulmonary effort is normal. No respiratory distress.   Chest:   Breasts:     Right: Normal. No mass, nipple discharge or skin change.      Left: Normal. No mass, nipple discharge or skin change.   Abdominal:      General: There is no distension.      " Palpations: Abdomen is soft.      Tenderness: There is no abdominal tenderness. There is no guarding or rebound.   Genitourinary:     General: Normal vulva.      Exam position: Lithotomy position.      Vagina: Normal.      Cervix: Normal. No cervical motion tenderness.      Uterus: Normal. Not enlarged and not tender.       Adnexa:         Right: No mass, tenderness or fullness.          Left: No mass, tenderness or fullness.     Lymphadenopathy:      Upper Body:      Right upper body: No axillary adenopathy.      Left upper body: No axillary adenopathy.   Neurological:      Mental Status: She is alert.

## 2025-06-19 ENCOUNTER — HOSPITAL ENCOUNTER (EMERGENCY)
Facility: HOSPITAL | Age: 40
Discharge: HOME | End: 2025-06-20
Attending: EMERGENCY MEDICINE
Payer: COMMERCIAL

## 2025-06-19 DIAGNOSIS — B27.90 EBV INFECTION: Primary | ICD-10-CM

## 2025-06-19 PROCEDURE — 93005 ELECTROCARDIOGRAM TRACING: CPT

## 2025-06-19 PROCEDURE — 99283 EMERGENCY DEPT VISIT LOW MDM: CPT

## 2025-06-19 RX ORDER — DIPHENHYDRAMINE HYDROCHLORIDE AND LIDOCAINE HYDROCHLORIDE AND ALUMINUM HYDROXIDE AND MAGNESIUM HYDRO
10 KIT ONCE
Status: COMPLETED | OUTPATIENT
Start: 2025-06-20 | End: 2025-06-20

## 2025-06-19 RX ORDER — IBUPROFEN 400 MG/1
400 TABLET, FILM COATED ORAL ONCE
Status: DISCONTINUED | OUTPATIENT
Start: 2025-06-20 | End: 2025-06-19

## 2025-06-19 RX ORDER — KETOROLAC TROMETHAMINE 30 MG/ML
15 INJECTION, SOLUTION INTRAMUSCULAR; INTRAVENOUS ONCE
Status: COMPLETED | OUTPATIENT
Start: 2025-06-20 | End: 2025-06-20

## 2025-06-19 RX ORDER — ACETAMINOPHEN 325 MG/1
650 TABLET ORAL ONCE
Status: COMPLETED | OUTPATIENT
Start: 2025-06-20 | End: 2025-06-20

## 2025-06-19 NOTE — Clinical Note
Annie Lazar was seen and treated in our emergency department on 6/19/2025.                Diagnosis:     Annie  is off the rest of the shift today.    She may return on this date:          If you have any questions or concerns, please don't hesitate to call.      Sachin Leon MD    ______________________________           _______________          _______________  Hospital Representative                              Date                                Time

## 2025-06-20 ENCOUNTER — APPOINTMENT (EMERGENCY)
Dept: RADIOLOGY | Facility: HOSPITAL | Age: 40
End: 2025-06-20
Payer: COMMERCIAL

## 2025-06-20 VITALS
OXYGEN SATURATION: 98 % | RESPIRATION RATE: 18 BRPM | DIASTOLIC BLOOD PRESSURE: 77 MMHG | TEMPERATURE: 99.3 F | HEART RATE: 94 BPM | SYSTOLIC BLOOD PRESSURE: 157 MMHG

## 2025-06-20 LAB
ATRIAL RATE: 98 BPM
P AXIS: 72 DEGREES
PR INTERVAL: 120 MS
QRS AXIS: 80 DEGREES
QRSD INTERVAL: 74 MS
QT INTERVAL: 320 MS
QTC INTERVAL: 409 MS
T WAVE AXIS: 44 DEGREES
VENTRICULAR RATE: 98 BPM

## 2025-06-20 PROCEDURE — 70491 CT SOFT TISSUE NECK W/DYE: CPT

## 2025-06-20 PROCEDURE — 96372 THER/PROPH/DIAG INJ SC/IM: CPT

## 2025-06-20 PROCEDURE — 93010 ELECTROCARDIOGRAM REPORT: CPT | Performed by: INTERNAL MEDICINE

## 2025-06-20 PROCEDURE — 99284 EMERGENCY DEPT VISIT MOD MDM: CPT | Performed by: EMERGENCY MEDICINE

## 2025-06-20 RX ORDER — DIPHENHYDRAMINE HYDROCHLORIDE AND LIDOCAINE HYDROCHLORIDE AND ALUMINUM HYDROXIDE AND MAGNESIUM HYDRO
10 KIT EVERY 4 HOURS PRN
Qty: 237 ML | Refills: 0 | Status: SHIPPED | OUTPATIENT
Start: 2025-06-20 | End: 2025-06-26 | Stop reason: CLARIF

## 2025-06-20 RX ADMIN — DEXAMETHASONE 10 MG: 2 TABLET ORAL at 00:01

## 2025-06-20 RX ADMIN — DIPHENHYDRAMINE HYDROCHLORIDE AND LIDOCAINE HYDROCHLORIDE AND ALUMINUM HYDROXIDE AND MAGNESIUM HYDRO 10 ML: KIT at 00:01

## 2025-06-20 RX ADMIN — IOHEXOL 85 ML: 350 INJECTION, SOLUTION INTRAVENOUS at 02:20

## 2025-06-20 RX ADMIN — KETOROLAC TROMETHAMINE 15 MG: 30 INJECTION, SOLUTION INTRAMUSCULAR; INTRAVENOUS at 00:04

## 2025-06-20 RX ADMIN — ACETAMINOPHEN 650 MG: 325 TABLET ORAL at 00:01

## 2025-06-20 NOTE — ED ATTENDING ATTESTATION
Final Diagnoses:     1. EBV infection           I, Michael Lopez MD, saw and evaluated the patient. All available labs and X-rays were ordered by me or the resident / non-physician and have been reviewed by myself. I discussed the patient with the resident / non-physician and agree with the resident's / non-physician practitioner's findings and plan as documented in the resident's / non-physician practicitioner's note, except where noted.   At this point, I agree with the current assessment done in the ED.   I was present during key portions of all procedures performed unless otherwise stated.     HPI:  NURSING TRIAGE:    This is a 39 y.o. female presenting for evaluation of EBV.  The patient was diagnosed with Mono on Monday.  She had a blood test telling her it was that.  She has been having increasing pain, difficult swallowing b/c of tonsillar pain (not inability)  Started on steroids which didn't ehlp  +fevers  +chills  No nausea/vomiting  Nothing has helped  Denies any upper respiratory tract infection symptoms (cough, congestion, rhinorrhea).   No dizziness/LH Chief Complaint   Patient presents with    Sore Throat     Pt started steroids for mono on Monday. On Tuesday the pt began to have trouble speaking and swallow. Pt states she is having some trouble breathing and there has been blood in her spit.       PHYSICAL: ASSESSMENT + PLAN:   Pertinent: Tonsils are bilaterally enlarged with large white exudates noted.  They are kissing  The posterior oropharynx can still be visualized  +anterior lymph nodes but small.  Spitting into bag  Still appears hydrated  Belly soft  No hepatosplenomegaly noted.  No CVAT  No reobund/guarding.  2+ pulses bilaterally equal.    General: VS reviewed  Appears in NAD  awake, alert.   Well-nourished, well-developed. Appears stated age.   Head: Normocephalic, atraumatic  Eyes: EOM-I. No diplopia.   No hyphema.   No subconjunctival hemorrhages.  Symmetrical lids.   ENT:  Atraumatic external nose and ears.    Mildly dry MM  No malocclusion. No stridor. No drooling.  Neck: No JVD.  CV: No pallor noted  Lungs:   No tachypnea  No respiratory distress  Abd: soft nt nd no rebound/guarding  MSK:   FROM spontaneously  Skin: Dry, intact.   Neuro: Awake, alert, GCS15, CN II-XII grossly intact.   Motor grossly intact.  Psychiatric/Behavioral: interacting normally; appropriate mood/affect.    Exam: deferred    Vitals:    06/19/25 2331 06/20/25 0000   BP: 127/82 157/77   Pulse: 105 94   Resp: 16 18   Temp: 99.3 °F (37.4 °C)    TempSrc: Temporal    SpO2: 99% 98%    Trial magic mouth wash + NSAIDs  If no improvement, will place IV and likely just do imaging for other pathologies  The uvula is midline so I don't think that this is likely PTA.  BUT  If no improvement with other interventions, can consider that and expand workup.       There are no obvious limitations to social determinants of care.   Nursing note reviewed.   Vitals reviewed.   Orders placed by myself and/or advanced practitioner / resident.    Previous chart was reviewed  History obtained from: Patient  Language barrier: None  Limitations to the history obtained: None Critical Care Time:      Past Medical: Past Surgical:    has a past medical history of Allergic rhinitis, Asthma, Cervical polyp, Disease of thyroid gland, Postcoital bleeding, and Varicella.  has a past surgical history that includes LASIK; [place] tooth extraction; and Colposcopy.   Social: Cardiac (Echo/Cath)   Social History     Substance and Sexual Activity   Alcohol Use Yes    Alcohol/week: 1.0 standard drink of alcohol    Types: 1 Cans of beer per week    Comment: Socially     Tobacco Use History[1]  Social History     Substance and Sexual Activity   Drug Use No    No results found for this or any previous visit.    No results found for this or any previous visit.    No results found for this or any previous visit.     Labs: Imaging:   Labs Reviewed - No data  to display CT soft tissue neck with contrast   Final Result      Findings consistent with bilateral tonsillitis. No evidence of abscess.      Incidental thyroid nodule (s) for which nonemergent thyroid ultrasound is recommended.            Workstation performed: [id number]            Medications: Code Status:   Medications   acetaminophen (TYLENOL) tablet 650 mg (650 mg Oral Given 6/20/25 0001)   dexamethasone (DECADRON) tablet 10 mg (10 mg Oral Given 6/20/25 0001)   diphenhydramine, lidocaine, Al/Mg hydroxide, simethicone (Magic Mouthwash) oral solution 10 mL (10 mL Swish & Spit Given 6/20/25 0001)   ketorolac (TORADOL) injection 15 mg (15 mg Intramuscular Given 6/20/25 0004)   iohexol (OMNIPAQUE) 350 MG/ML injection (SINGLE-DOSE) 85 mL (85 mL Intravenous Given 6/20/25 0220)    Code Status: Prior  Advance Directive and Living Will:      Power of :    POLST:       Orders Placed This Encounter   Procedures    CT soft tissue neck with contrast    ECG 12 lead     Time reflects when diagnosis was documented in both MDM as applicable and the Disposition within this note       Time User Action Codes Description Comment    6/20/2025 12:40 AM Wing Moreno Add [B27.90] Monospot test positive     6/20/2025 12:40 AM Wing Moreno Remove [B27.90] Monospot test positive     6/20/2025 12:40 AM Wing Moreno Add [B27.90] EBV infection           ED Disposition       ED Disposition   Discharge    Condition   Stable    Date/Time   Fri Jun 20, 2025 12:40 AM    Comment   Annie Lazar discharge to home/self care.                   Follow-up Information       Follow up With Specialties Details Why Contact Info    Tami Heredia MD Family Medicine             Discharge Medication List as of 6/20/2025  3:32 AM        START taking these medications    Details   diphenhydramine, lidocaine, Al/Mg hydroxide, simethicone (Magic Mouthwash) SUSP Swish and spit 10 mL every 4 (four) hours as needed for mouth pain or discomfort,  Starting Fri 6/20/2025, Normal           CONTINUE these medications which have NOT CHANGED    Details   albuterol (ProAir HFA) 90 mcg/act inhaler Inhale 2 puffs every 6 (six) hours as needed for wheezing or shortness of breath, Starting Tue 5/30/2023, Normal      Calcium-Phosphorus-Vitamin D (VITAMIN D3/CALCIUM/PHOSPHORUS PO) Take by mouth, Historical Med      clindamycin (CLEOCIN T) 1 % lotion Apply topically as needed, Starting Mon 10/7/2024, Historical Med      Cyanocobalamin (B-12) 1000 MCG TBCR Take by mouth, Historical Med      liothyronine (CYTOMEL) 5 mcg tablet Take 5 mcg by mouth daily, Starting Fri 11/8/2024, Historical Med      Multiple Vitamin (multivitamin) capsule Take 1 capsule by mouth daily, Historical Med      Progesterone 200 MG CAPS Take 1 capsule by mouth every morning, Starting Wed 10/30/2024, Historical Med      testosterone cypionate (DEPO-TESTOSTERONE) 100 mg/mL IM injection Inject 0.05 mL into a muscle once Once every 2 weeks., Historical Med           No discharge procedures on file.  Prior to Admission Medications   Prescriptions Last Dose Informant Patient Reported? Taking?   Calcium-Phosphorus-Vitamin D (VITAMIN D3/CALCIUM/PHOSPHORUS PO)   Yes No   Sig: Take by mouth   Patient not taking: Reported on 11/12/2024   Cyanocobalamin (B-12) 1000 MCG TBCR   Yes No   Sig: Take by mouth   Multiple Vitamin (multivitamin) capsule   Yes No   Sig: Take 1 capsule by mouth daily   Patient not taking: Reported on 11/12/2024   Progesterone 200 MG CAPS   Yes No   Sig: Take 1 capsule by mouth every morning   albuterol (ProAir HFA) 90 mcg/act inhaler   No No   Sig: Inhale 2 puffs every 6 (six) hours as needed for wheezing or shortness of breath   Patient not taking: Reported on 11/12/2024   clindamycin (CLEOCIN T) 1 % lotion   Yes No   Sig: Apply topically as needed   liothyronine (CYTOMEL) 5 mcg tablet   Yes No   Sig: Take 5 mcg by mouth daily   testosterone cypionate (DEPO-TESTOSTERONE) 100 mg/mL IM  "injection   Yes No   Sig: Inject 0.05 mL into a muscle once Once every 2 weeks.      Facility-Administered Medications: None                        Portions of the record may have been created with voice recognition software. Occasional wrong word or \"sound a like\" substitutions may have occurred due to the inherent limitations of voice recognition software. Read the chart carefully and recognize, using context, where substitutions have occurred.    Electronically signed by:  Michael Lopez         [1]   Social History  Tobacco Use   Smoking Status Never   Smokeless Tobacco Never     "

## 2025-06-20 NOTE — DISCHARGE INSTRUCTIONS
Take NyQuil and Motrin during the day, DayQuil and Motrin during the night for your symptoms  Use the prescribed Magic mouthwash  Return to the emergency department if your symptoms worsen

## 2025-06-21 NOTE — ED PROVIDER NOTES
Time reflects when diagnosis was documented in both MDM as applicable and the Disposition within this note       Time User Action Codes Description Comment    6/20/2025 12:40 AM Wing Moreno Add [B27.90] Monospot test positive     6/20/2025 12:40 AM Wing Moreno Remove [B27.90] Monospot test positive     6/20/2025 12:40 AM Wing Moreno Add [B27.90] EBV infection           ED Disposition       ED Disposition   Discharge    Condition   Stable    Date/Time   Fri Jun 20, 2025 12:40 AM    Comment   Annie Lazar discharge to home/self care.                   Assessment & Plan       Medical Decision Making  Patient presenting with known EBV for severe throat pain, difficulty tolerating p.o.    Will treat symptomatically with Magic mouthwash, Decadron.  Given patient's worsening ability to tolerate p.o., will obtain CT to evaluate for abscess    Amount and/or Complexity of Data Reviewed  Radiology: ordered.    Risk  OTC drugs.  Prescription drug management.             Medications   acetaminophen (TYLENOL) tablet 650 mg (650 mg Oral Given 6/20/25 0001)   dexamethasone (DECADRON) tablet 10 mg (10 mg Oral Given 6/20/25 0001)   diphenhydramine, lidocaine, Al/Mg hydroxide, simethicone (Magic Mouthwash) oral solution 10 mL (10 mL Swish & Spit Given 6/20/25 0001)   ketorolac (TORADOL) injection 15 mg (15 mg Intramuscular Given 6/20/25 0004)   iohexol (OMNIPAQUE) 350 MG/ML injection (SINGLE-DOSE) 85 mL (85 mL Intravenous Given 6/20/25 0220)       ED Risk Strat Scores                    No data recorded                            History of Present Illness       Chief Complaint   Patient presents with    Sore Throat     Pt started steroids for mono on Monday. On Tuesday the pt began to have trouble speaking and swallow. Pt states she is having some trouble breathing and there has been blood in her spit.        Past Medical History[1]   Past Surgical History[2]   Family History[3]   Social History[4]   E-Cigarette/Vaping     E-Cigarette Use Never User       E-Cigarette/Vaping Substances    Nicotine No     THC No     CBD No     Flavoring No     Other No     Unknown No       I have reviewed and agree with the history as documented.     Patient is a 39-year-old female, recently diagnosed with mono, given a Medrol Dosepak, presenting with worsening pain with swallowing, sore throat, and general feeling unwell.  Patient has difficulty swallowing but still is able to eat.  Reports severe pain, and that is easier to spit up rather than swallow her saliva.        Review of Systems   All other systems reviewed and are negative.          Objective       ED Triage Vitals [06/19/25 2331]   Temperature Pulse Blood Pressure Respirations SpO2 Patient Position - Orthostatic VS   99.3 °F (37.4 °C) 105 127/82 16 99 % --      Temp Source Heart Rate Source BP Location FiO2 (%) Pain Score    Temporal Monitor -- -- 10 - Worst Possible Pain      Vitals      Date and Time Temp Pulse SpO2 Resp BP Pain Score FACES Pain Rating User   06/20/25 0000 -- 94 98 % 18 157/77 -- -- FR   06/19/25 2331 99.3 °F (37.4 °C) 105 99 % 16 127/82 10 - Worst Possible Pain -- MB            Physical Exam  Vitals and nursing note reviewed.   Constitutional:       General: She is not in acute distress.     Appearance: She is well-developed.   HENT:      Head: Normocephalic and atraumatic.      Mouth/Throat:      Pharynx: Posterior oropharyngeal erythema present.      Tonsils: Tonsillar exudate present. No tonsillar abscesses.      Comments: Significant tonsillar swelling with exudates    Eyes:      Conjunctiva/sclera: Conjunctivae normal.       Cardiovascular:      Rate and Rhythm: Normal rate and regular rhythm.      Heart sounds: No murmur heard.  Pulmonary:      Effort: Pulmonary effort is normal. No respiratory distress.      Breath sounds: Normal breath sounds.   Abdominal:      Palpations: Abdomen is soft.      Tenderness: There is no abdominal tenderness.     Musculoskeletal:          General: No swelling.      Cervical back: Neck supple.     Skin:     General: Skin is warm and dry.      Capillary Refill: Capillary refill takes less than 2 seconds.     Neurological:      Mental Status: She is alert.     Psychiatric:         Mood and Affect: Mood normal.         Results Reviewed       None            CT soft tissue neck with contrast   Final Interpretation by Khadra Person MD (06/20 0232)      Findings consistent with bilateral tonsillitis. No evidence of abscess.      Incidental thyroid nodule (s) for which nonemergent thyroid ultrasound is recommended.            Workstation performed: [id number]             Procedures    ED Medication and Procedure Management   Prior to Admission Medications   Prescriptions Last Dose Informant Patient Reported? Taking?   Calcium-Phosphorus-Vitamin D (VITAMIN D3/CALCIUM/PHOSPHORUS PO)   Yes No   Sig: Take by mouth   Patient not taking: Reported on 11/12/2024   Cyanocobalamin (B-12) 1000 MCG TBCR   Yes No   Sig: Take by mouth   Multiple Vitamin (multivitamin) capsule   Yes No   Sig: Take 1 capsule by mouth daily   Patient not taking: Reported on 11/12/2024   Progesterone 200 MG CAPS   Yes No   Sig: Take 1 capsule by mouth every morning   albuterol (ProAir HFA) 90 mcg/act inhaler   No No   Sig: Inhale 2 puffs every 6 (six) hours as needed for wheezing or shortness of breath   Patient not taking: Reported on 11/12/2024   clindamycin (CLEOCIN T) 1 % lotion   Yes No   Sig: Apply topically as needed   liothyronine (CYTOMEL) 5 mcg tablet   Yes No   Sig: Take 5 mcg by mouth daily   testosterone cypionate (DEPO-TESTOSTERONE) 100 mg/mL IM injection   Yes No   Sig: Inject 0.05 mL into a muscle once Once every 2 weeks.      Facility-Administered Medications: None     Discharge Medication List as of 6/20/2025  3:32 AM        START taking these medications    Details   diphenhydramine, lidocaine, Al/Mg hydroxide, simethicone (Magic Mouthwash) SUSP Swish and spit 10 mL  every 4 (four) hours as needed for mouth pain or discomfort, Starting Fri 6/20/2025, Normal           CONTINUE these medications which have NOT CHANGED    Details   albuterol (ProAir HFA) 90 mcg/act inhaler Inhale 2 puffs every 6 (six) hours as needed for wheezing or shortness of breath, Starting Tue 5/30/2023, Normal      Calcium-Phosphorus-Vitamin D (VITAMIN D3/CALCIUM/PHOSPHORUS PO) Take by mouth, Historical Med      clindamycin (CLEOCIN T) 1 % lotion Apply topically as needed, Starting Mon 10/7/2024, Historical Med      Cyanocobalamin (B-12) 1000 MCG TBCR Take by mouth, Historical Med      liothyronine (CYTOMEL) 5 mcg tablet Take 5 mcg by mouth daily, Starting Fri 11/8/2024, Historical Med      Multiple Vitamin (multivitamin) capsule Take 1 capsule by mouth daily, Historical Med      Progesterone 200 MG CAPS Take 1 capsule by mouth every morning, Starting Wed 10/30/2024, Historical Med      testosterone cypionate (DEPO-TESTOSTERONE) 100 mg/mL IM injection Inject 0.05 mL into a muscle once Once every 2 weeks., Historical Med           No discharge procedures on file.  ED SEPSIS DOCUMENTATION   Time reflects when diagnosis was documented in both MDM as applicable and the Disposition within this note       Time User Action Codes Description Comment    6/20/2025 12:40 AM Wing Moreno Add [B27.90] Monospot test positive     6/20/2025 12:40 AM Wing Moreno Remove [B27.90] Monospot test positive     6/20/2025 12:40 AM Wing Moreno Add [B27.90] EBV infection                    [1]   Past Medical History:  Diagnosis Date    Allergic rhinitis     Last assessed 06/02/12    Asthma     Last assessed 11/24/17    Cervical polyp     Last assessed 02/21/17    Disease of thyroid gland     nodule    Postcoital bleeding     Varicella     childhood   [2]   Past Surgical History:  Procedure Laterality Date    COLPOSCOPY      Polyp on cervix    LASIK      WISDOM TOOTH EXTRACTION     [3]   Family History  Problem Relation Name  Age of Onset    Miscarriages / Stillbirths Mother Christen     Breast cancer Mother Christen 63    Hyperlipidemia Father Raúl     Cancer Maternal Grandmother Cely     COPD Paternal Grandfather      Cervical cancer Neg Hx      Colon cancer Neg Hx      Endometrial cancer Neg Hx      Ovarian cancer Neg Hx      Uterine cancer Neg Hx     [4]   Social History  Tobacco Use    Smoking status: Never    Smokeless tobacco: Never   Vaping Use    Vaping status: Never Used   Substance Use Topics    Alcohol use: Yes     Alcohol/week: 1.0 standard drink of alcohol     Types: 1 Cans of beer per week     Comment: Socially    Drug use: No        Wing Moreno MD  06/20/25 0504

## 2025-06-26 ENCOUNTER — OFFICE VISIT (OUTPATIENT)
Dept: FAMILY MEDICINE CLINIC | Facility: CLINIC | Age: 40
End: 2025-06-26
Payer: COMMERCIAL

## 2025-06-26 VITALS
WEIGHT: 177.2 LBS | TEMPERATURE: 97.8 F | RESPIRATION RATE: 18 BRPM | BODY MASS INDEX: 26.25 KG/M2 | HEART RATE: 82 BPM | SYSTOLIC BLOOD PRESSURE: 128 MMHG | OXYGEN SATURATION: 99 % | HEIGHT: 69 IN | DIASTOLIC BLOOD PRESSURE: 80 MMHG

## 2025-06-26 DIAGNOSIS — B27.90 EBV INFECTION: Primary | ICD-10-CM

## 2025-06-26 DIAGNOSIS — R05.1 ACUTE COUGH: ICD-10-CM

## 2025-06-26 DIAGNOSIS — E04.1 THYROID NODULE: ICD-10-CM

## 2025-06-26 PROCEDURE — 99214 OFFICE O/P EST MOD 30 MIN: CPT | Performed by: FAMILY MEDICINE

## 2025-06-26 RX ORDER — DEXTROMETHORPHAN HYDROBROMIDE AND PROMETHAZINE HYDROCHLORIDE 15; 6.25 MG/5ML; MG/5ML
5 SYRUP ORAL 4 TIMES DAILY PRN
Qty: 180 ML | Refills: 0 | Status: SHIPPED | OUTPATIENT
Start: 2025-06-26

## 2025-06-26 NOTE — PROGRESS NOTES
"Name: Annie Lazar      : 1985      MRN: [id number]  Encounter Provider: Endy Alvarado MD  Encounter Date: 2025   Encounter department: [facility]  :  Assessment & Plan  EBV infection  Reviewed [facility] report and ER report.   Recheck Hepatic function in 2 weeks.   Check US abdomen in 2 weeks.   Educated pt usually avoid gym/heavy physical activities for as least 4 weeks.   Orders:    US abdomen complete; Future    Hepatic function panel; Future    Thyroid nodule    Orders:    US thyroid; Future    Acute cough    Orders:    promethazine-dextromethorphan (PHENERGAN-DM) 6.25-15 mg/5 mL oral syrup; Take 5 mL by mouth 4 (four) times a day as needed for cough           History of Present Illness   HPI    Pt is here by herself.     6/15/2025 test mono positive in [facility].   Liver enzymes elevated.   Went to ER on 2025 for sore throat.   CT neck showed \"Findings consistent with bilateral tonsillitis. No evidence of abscess.   Incidental thyroid nodule (s) for which nonemergent thyroid ultrasound is recommended.\"  Felt better now.   Still cough because throat irritation.   Would like to recheck labs and would like to go back to gym.         Review of Systems   Constitutional:  Negative for appetite change, chills and fever.   HENT:  Negative for congestion, ear pain, sinus pain and sore throat.    Eyes:  Negative for discharge and itching.   Respiratory:  Positive for cough. Negative for apnea, chest tightness, shortness of breath and wheezing.    Cardiovascular:  Negative for chest pain, palpitations and leg swelling.   Gastrointestinal:  Negative for abdominal pain, anal bleeding, constipation, diarrhea, nausea and vomiting.   Endocrine: Negative for cold intolerance, heat intolerance and polyuria.   Genitourinary:  Negative for difficulty urinating and dysuria.   Musculoskeletal:  Negative for arthralgias, back pain and myalgias.   Skin:  Negative for rash. " "  Neurological:  Negative for dizziness and headaches.   Psychiatric/Behavioral:  Negative for agitation.        Objective   /80 (Patient Position: Sitting, Cuff Size: Adult)   Pulse 82   Temp 97.8 °F (36.6 °C) (Temporal)   Resp 18   Ht 5' 8.5\" (1.74 m)   Wt 80.4 kg (177 lb 3.2 oz)   SpO2 99%   BMI 26.55 kg/m²      Physical Exam  Constitutional:       General: She is not in acute distress.     Appearance: She is well-developed.   HENT:      Head: Normocephalic.      Right Ear: External ear normal.      Left Ear: External ear normal.      Nose: Nose normal.      Mouth/Throat:      Pharynx: Posterior oropharyngeal erythema present. No oropharyngeal exudate.     Eyes:      General:         Right eye: No discharge.         Left eye: No discharge.      Conjunctiva/sclera: Conjunctivae normal.     Neck:      Thyroid: No thyromegaly.     Cardiovascular:      Rate and Rhythm: Normal rate and regular rhythm.      Heart sounds: Normal heart sounds. No murmur heard.     No friction rub. No gallop.   Pulmonary:      Effort: Pulmonary effort is normal. No respiratory distress.      Breath sounds: Normal breath sounds. No wheezing or rales.   Chest:      Chest wall: No tenderness.   Abdominal:      General: Bowel sounds are normal. There is no distension.      Palpations: Abdomen is soft. There is no mass.      Tenderness: There is no abdominal tenderness. There is no guarding or rebound.     Musculoskeletal:         General: No tenderness or deformity. Normal range of motion.      Cervical back: Normal range of motion.   Lymphadenopathy:      Cervical: No cervical adenopathy.     Neurological:      Mental Status: She is alert.         "

## 2025-07-02 ENCOUNTER — APPOINTMENT (OUTPATIENT)
Dept: LAB | Facility: CLINIC | Age: 40
End: 2025-07-02
Payer: COMMERCIAL

## 2025-07-02 DIAGNOSIS — B27.90 EBV INFECTION: ICD-10-CM

## 2025-07-02 LAB
ALBUMIN SERPL BCG-MCNC: 4 G/DL (ref 3.5–5)
ALP SERPL-CCNC: 93 U/L (ref 34–104)
ALT SERPL W P-5'-P-CCNC: 224 U/L (ref 7–52)
AST SERPL W P-5'-P-CCNC: 90 U/L (ref 13–39)
BILIRUB DIRECT SERPL-MCNC: 0.14 MG/DL (ref 0–0.2)
BILIRUB SERPL-MCNC: 0.53 MG/DL (ref 0.2–1)
PROT SERPL-MCNC: 7.3 G/DL (ref 6.4–8.4)

## 2025-07-02 PROCEDURE — 80076 HEPATIC FUNCTION PANEL: CPT

## 2025-07-02 PROCEDURE — 36415 COLL VENOUS BLD VENIPUNCTURE: CPT

## 2025-07-03 ENCOUNTER — HOSPITAL ENCOUNTER (OUTPATIENT)
Dept: ULTRASOUND IMAGING | Facility: HOSPITAL | Age: 40
Discharge: HOME | End: 2025-07-03
Attending: FAMILY MEDICINE
Payer: COMMERCIAL

## 2025-07-03 DIAGNOSIS — B27.90 EBV INFECTION: ICD-10-CM

## 2025-07-03 DIAGNOSIS — E04.1 THYROID NODULE: ICD-10-CM

## 2025-07-03 PROCEDURE — 76536 US EXAM OF HEAD AND NECK: CPT

## 2025-07-03 PROCEDURE — 76700 US EXAM ABDOM COMPLETE: CPT
